# Patient Record
Sex: FEMALE | Race: WHITE | Employment: UNEMPLOYED | URBAN - METROPOLITAN AREA
[De-identification: names, ages, dates, MRNs, and addresses within clinical notes are randomized per-mention and may not be internally consistent; named-entity substitution may affect disease eponyms.]

---

## 2017-09-29 ENCOUNTER — APPOINTMENT (OUTPATIENT)
Dept: RADIOLOGY | Facility: CLINIC | Age: 62
End: 2017-09-29
Payer: COMMERCIAL

## 2017-09-29 ENCOUNTER — ALLSCRIPTS OFFICE VISIT (OUTPATIENT)
Dept: OTHER | Facility: OTHER | Age: 62
End: 2017-09-29

## 2017-09-29 DIAGNOSIS — M79.642 PAIN OF LEFT HAND: ICD-10-CM

## 2017-09-29 PROCEDURE — 73130 X-RAY EXAM OF HAND: CPT

## 2017-10-03 NOTE — PROGRESS NOTES
Assessment  1  Pain of left hand (729 5) (M79 642)   2  Trigger middle finger of left hand (727 03) (M65 332)    Plan  Pain of left hand    · * XR HAND 3+ VIEW LEFT; Status:Resulted - Requires Verification,Retrospective By  Protocol Authorization;   Done: 40SIT1960 08:11AM    Criss Bay is in the early stages of trigger finger of the 3rd digit  She may try over the counter medication such as Aleve to help with the inflammation to reduce triggering symptoms  I advised her to take medications with dinner to allow time for it to take effect  She may follow up with me in 2-3 weeks if the medications do not help with symptoms, at that time we will provide a cortisone injection  Discussion/Summary  The patient was counseled regarding diagnostic results,-instructions for management,-risk factor reductions,-prognosis,-patient and family education,-impressions,-risks and benefits of treatment options,-importance of compliance with treatment  Chief Complaint  1  Hand Problem    History of Present Illness  HPI: Criss Bay is a 65year old female who presents today complaining of mild to moderate non-radiating left middle finger and hand pain with mild clicking when flexing her middle finger  She says that she has been feeling this pain for 3-4 months, and denies any memorable injury  she states that she feel the pain when she rests her hand in one position for a period of time  However, after a few minutes of moving the hand and flexing the fingers the pain and clicking subsides  She denies pain during the day, and does not have issues when typing or doing work with the hands  She denies any numbness or tingling  Review of Systems    Constitutional: No fever, no chills, feels well, no tiredness, no recent weight gain or loss  Eyes: No complaints of eyesight problems, no red eyes  ENT: no loss of hearing, no nosebleeds, no sore throat     Cardiovascular: No complaints of chest pain, no palpitations, no leg claudication or lower extremity edema  Respiratory: no compliants of shortness of breath, no wheezing, no cough  Gastrointestinal: no complaints of abdominal pain, no constipation, no nausea or diarrhea, no vomiting, no bloody stools  Genitourinary: no complaints of dysuria, no incontinence  Musculoskeletal: as noted in HPI  Integumentary: no complaints of skin rash or lesion, no itching or dry skin, no skin wounds  Neurological: no complaints of headache, no confusion, no numbness or tingling, no dizziness  Endocrine: No complaints of muscle weakness, no feelings of weakness, no frequent urination, no excessive thirst    Psychiatric: No suicidal thoughts, no anxiety, no feelings of depression  ROS reviewed  Active Problems  1  Body mass index (BMI) 21 0-21 9, adult (V85 1) (Z68 21)   2  Chest discomfort (786 59) (R07 89)   3  Colon cancer screening (V76 51) (Z12 11)   4  Dyspareunia (625 0)   5  Encounter for routine gynecological examination (V72 31) (Z01 419)   6  Hip tendonitis, left (727 09) (M76 892)   7  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)   8  Left groin pain (789 09) (R10 32)   9  Left hip pain (719 45) (M25 552)   10  Left-sided low back pain with left-sided sciatica (724 3) (M54 42)   11  Menopausal vaginal dryness (627 2) (N95 1)   12  Menopause (627 2) (Z78 0)   13  Neuritis (729 2) (M79 2)   14  Osteoporosis screening (V82 81) (Z13 820)   15  Pain of left hand (729 5) (M79 642)   16  Pes planus, unspecified laterality (734) (M21 40)   17  Screening for osteoporosis (V82 81) (Z13 820)   18   Visit for screening mammogram (V76 12) (Z12 31)    Past Medical History   · History of Denial Of Any Significant Medical History   · History of Diabetes mellitus screening (V77 1) (Z13 1)   · History of Screening for cardiovascular condition (V81 2) (Z13 6)   · History of Screening for thyroid disorder (V77 0) (Z13 29)   · History of Visit for screening mammogram (V76 12) (Z12 31)    The active problems and past medical history were reviewed and updated today  Surgical History   · History of Hemorrhoidectomy   · History of Incisional Breast Biopsy   · History of Shoulder Surgery   · History of Supracervical Hysterectomy    The surgical history was reviewed and updated today  Family History  Mother    · Family history of malignant neoplasm of uterus (V16 49) (Z80 51)  Father    · Family history of cardiac disorder (V17 49) (Z82 49)   · Family history of diabetes mellitus (V18 0) (Z83 3)  Sister    · Family history of Hip arthrosis    The family history was reviewed and updated today  Social History   · Never A Smoker   · No drug use   · Occasional alcohol use  The social history was reviewed and updated today  The social history was reviewed and is unchanged  Current Meds   1  Estring 2 MG Vaginal Ring; INSERT 1 RING INTRAVAGINALLY EVERY 3 MONTHS; Therapy: 43SYV6331 to (Jessica Humphries)  Requested for: 72JFF5926; Last   Rx:37Zyx5093 Ordered   2  Pennsaid 2 % Transdermal Solution; use 1 pump q 12 hours as needed to painful areas; Therapy: 40Kjw4426 to (Last Rx:77Ilh9790)  Requested for: 93Wim1913 Ordered    The medication list was reviewed and updated today  Allergies  1  No Known Drug Allergies    Vitals   Recorded: 19WUH4233 08:05AM   Heart Rate 80   Systolic 222   Diastolic 80   Height 5 ft 7 in   Weight 139 lb    BMI Calculated 21 77   BSA Calculated 1 73     Physical Exam    Left Third Digit: no deformity, nodules, erythema, ecchymosis, edema, or tenderness,-ROM within normal limits in flexion and extension at the MCP, PIP, and DIP joints,-strength within normal limits-and-no evidence of laxity or instability at the MCP, PIP, and DIP joints  Left Hand: Appearance: Normal  Tenderness: MCP joint(s) (3rd and MCP )  Left Third Digit/Hand: Appearance: Normal  Tenderness: 3rd MCP joint     Constitutional - General appearance: Normal    Musculoskeletal - Gait and station: Normal -Muscle strength/tone: Normal -Upper extremity compartments: Normal    Cardiovascular - Pulses: Normal -Examination of extremities for edema and/or varicosities: Normal    Skin - Skin and subcutaneous tissue: Normal    Neurologic - Sensation: Normal -Upper extremity peripheral neuro exam: Normal    Psychiatric - Orientation to person, place, and time: Normal -Mood and affect: Normal    Eyes   Conjunctiva and lids: Normal     Pupils and irises: Normal        Results/Data  * XR HAND 3+ VIEW LEFT [id number] 08:11AM [facility] Order Number: [id number]     Test Name Result Flag Reference   XR HAND 3+ VW LEFT (Report)     LEFT HAND     INDICATION: Left hand pain  COMPARISON: None     VIEWS: 3     IMAGES: 3     For the purposes of institution wide universal language the following terms will apply: (thumb=1st digit/finger, index finger=2nd digit/finger, long finger=3rd digit/finger, ring=4th digit/finger and small finger=5th digit/finger)     FINDINGS:     There is no acute fracture or dislocation  No degenerative changes  No lytic or blastic lesions are seen  Soft tissues are unremarkable  IMPRESSION:     No acute osseous abnormality  Workstation performed: [id number]     Signed by:   Ernesto Mora MD   9/29/17                             I personally reviewed the films/images/results in the office today  My interpretation follows  X-ray Review X-Rays of the left hand and wrist did not show any osseus fracture or signs of osteoarthritis  Also, presented with proper anatomical bony alinement  Attending Note  Scribe Attestation:   Scribe Attestation IJudith ATC am acting as a scribe in the presence of the attending physician while the attending physician examines the patient  Physician Attestation:   Adam Mathew MD personally performed the services described in this documentation as scribed in my presence, and it is both accurate and complete  Future Appointments    Date/Time Provider Specialty Site   10/05/2017 08:00 AM COLIN Espitia   [address] [facility]     Signatures   Electronically signed by : COLIN Field ; Oct  2 2017  7:49AM EST                       (Author)

## 2017-11-02 ENCOUNTER — ALLSCRIPTS OFFICE VISIT (OUTPATIENT)
Dept: OTHER | Facility: OTHER | Age: 62
End: 2017-11-02

## 2017-11-02 DIAGNOSIS — Z78.0 ASYMPTOMATIC MENOPAUSAL STATE: ICD-10-CM

## 2017-11-02 DIAGNOSIS — Z00.00 ENCOUNTER FOR GENERAL ADULT MEDICAL EXAMINATION WITHOUT ABNORMAL FINDINGS: ICD-10-CM

## 2017-11-07 LAB
HPV 18 (HISTORICAL): NOT DETECTED
HPV HIGH RISK 16/18 (HISTORICAL): NOT DETECTED
HPV16 (HISTORICAL): NOT DETECTED
PAP (HISTORICAL): NORMAL

## 2018-01-12 VITALS
SYSTOLIC BLOOD PRESSURE: 125 MMHG | HEIGHT: 67 IN | HEART RATE: 80 BPM | BODY MASS INDEX: 21.82 KG/M2 | WEIGHT: 139 LBS | DIASTOLIC BLOOD PRESSURE: 80 MMHG

## 2018-01-14 VITALS
HEIGHT: 67 IN | DIASTOLIC BLOOD PRESSURE: 62 MMHG | BODY MASS INDEX: 21.82 KG/M2 | SYSTOLIC BLOOD PRESSURE: 110 MMHG | WEIGHT: 139 LBS

## 2018-01-16 NOTE — PROGRESS NOTES
History of Present Illness  Care Coordination Encounter Information:   Type of Encounter: Telephonic   Last Office Visit: 4/19/16   Spoke to Patient  Care Coordination  Nurse [address] [place] [address] [address]:   The reason for call is to discuss outreach for follow up/needed services and coordination of meeting care plan treatment goals  I reached out to Patient as per central scheduling  She is willing to schedule her ortho appointment at this time  Put her in touch with central scheduling to do so  Active Problems    1  Body mass index (BMI) 21 0-21 9, adult (V85 1) (Z68 21)   2  Chest discomfort (786 59) (R07 89)   3  Colon cancer screening (V76 51) (Z12 11)   4  Dyspareunia (625 0)   5  Encounter for routine gynecological examination (V72 31) (Z01 419)   6  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)   7  Left groin pain (789 09) (R10 30)   8  Left hip pain (719 45) (M25 552)   9  Left-sided low back pain with left-sided sciatica (724 3) (M54 42)   10  Menopausal vaginal dryness (627 2) (N95 1)   11  Menopause (627 2) (Z78 0)   12  Neuritis (729 2) (M79 2)   13  Osteoporosis screening (V82 81) (Z13 820)   14  Pes planus, unspecified laterality (734) (M21 40)   15  Screening for osteoporosis (V82 81) (Z13 820)   16  Visit for screening mammogram (V76 12) (Z12 31)    Past Medical History    1  History of Denial Of Any Significant Medical History   2  History of Diabetes mellitus screening (V77 1) (Z13 1)   3  History of Screening for cardiovascular condition (V81 2) (Z13 6)   4  History of Screening for thyroid disorder (V77 0) (Z13 29)   5  History of Visit for screening mammogram (V76 12) (Z12 31)    Surgical History    1  History of Hemorrhoidectomy   2  History of Incisional Breast Biopsy   3  History of Shoulder Surgery   4  History of Supracervical Hysterectomy    Family History  Mother    1  Family history of malignant neoplasm of uterus (V16 49) (Z80 49)  Father    2  Family history of cardiac disorder (V17 49) (Z82 49)   3  Family history of diabetes mellitus (V18 0) (Z83 3)  Sister    4  Family history of Hip arthrosis    Social History    · Never A Smoker   · No drug use   · Occasional alcohol use    Current Meds    1  Pennsaid 2 % Transdermal Solution; use 1 pump q 12 hours as needed to painful   areas; Therapy: 88Kpy2463 to (Last Rx:00Plm9546)  Requested for: 21Stm9776 Ordered    2  Estring 2 MG Vaginal Ring; INSERT 1 RING INTRAVAGINALLY EVERY 3 MONTHS; Therapy: 53LWG5985 to (Raisa Meade)  Requested for: 99UVI1299; Last   Rx:20Rou2655 Ordered    Allergies    1  No Known Drug Allergies    Health Management   COLONOSCOPY; every 10 years; Last 34XKF3477; Next Due: 81IUS6273; Active    End of Encounter Meds    1  Pennsaid 2 % Transdermal Solution; use 1 pump q 12 hours as needed to painful   areas; Therapy: 23Olf3344 to (Last Rx:25Svz7624)  Requested for: 31Crf5390 Ordered    2  Estring 2 MG Vaginal Ring; INSERT 1 RING INTRAVAGINALLY EVERY 3 MONTHS; Therapy: 07TBQ6550 to (Raisa Meade)  Requested for: 21KVB9726; Last   SA:85LPL5561 Ordered    Patient Care Team    Care Team Member Role Specialty Office Number   Oly Sydney LAIRD  Specialist Obstetrics/Gynecology [phone]     Signatures   Electronically signed by :  Monica Orellana RN; May 23 2016  2:04PM EST                       (Author)

## 2018-11-06 ENCOUNTER — TELEPHONE (OUTPATIENT)
Dept: FAMILY MEDICINE CLINIC | Facility: CLINIC | Age: 63
End: 2018-11-06

## 2018-11-06 NOTE — TELEPHONE ENCOUNTER
DR John Smith - SHE WOULD LIKE TO KNOW IF YOU WILL TAKE HER ON AS A PT   YOU ALREADY SEE HER , [place] Zavaletajen

## 2018-12-14 ENCOUNTER — OFFICE VISIT (OUTPATIENT)
Dept: FAMILY MEDICINE CLINIC | Facility: CLINIC | Age: 63
End: 2018-12-14
Payer: COMMERCIAL

## 2018-12-14 VITALS
RESPIRATION RATE: 18 BRPM | OXYGEN SATURATION: 98 % | TEMPERATURE: 98.2 F | SYSTOLIC BLOOD PRESSURE: 126 MMHG | BODY MASS INDEX: 21.5 KG/M2 | HEIGHT: 67 IN | WEIGHT: 137 LBS | DIASTOLIC BLOOD PRESSURE: 84 MMHG | HEART RATE: 78 BPM

## 2018-12-14 DIAGNOSIS — M54.2 ARTHRALGIA OF CERVICAL SPINE: Primary | ICD-10-CM

## 2018-12-14 DIAGNOSIS — Z23 ENCOUNTER FOR IMMUNIZATION: ICD-10-CM

## 2018-12-14 PROBLEM — M79.18 CERVICAL MYOFASCIAL PAIN SYNDROME: Status: ACTIVE | Noted: 2018-12-14

## 2018-12-14 PROBLEM — M65.332 TRIGGER MIDDLE FINGER OF LEFT HAND: Status: ACTIVE | Noted: 2017-09-29

## 2018-12-14 PROCEDURE — 99214 OFFICE O/P EST MOD 30 MIN: CPT | Performed by: FAMILY MEDICINE

## 2018-12-14 PROCEDURE — 90471 IMMUNIZATION ADMIN: CPT

## 2018-12-14 PROCEDURE — 90715 TDAP VACCINE 7 YRS/> IM: CPT

## 2018-12-14 PROCEDURE — 90682 RIV4 VACC RECOMBINANT DNA IM: CPT

## 2018-12-14 PROCEDURE — 90472 IMMUNIZATION ADMIN EACH ADD: CPT

## 2018-12-14 NOTE — PROGRESS NOTES
I have identified this patient to be Chencho Ramos,  -1955  C/O: pain in neck  Had MRI done of the neck in 2012 by a neurologist in [place]  Apparently was benign  Has gotten a lot worse in the past 6 months  Red flag screens: No unexplained fevers/chills/sweats/weight loss/history of IVDA/immunosuppressive disorder/personal history of cancer/unusual night pain/deep bone pain/unexplained neurological symptoms such as numbness, motor weakness, sphincter dysfunction of bladder or bowel, saddle anesthesia, cramping ache legs with activity such as walking (for spinal stenosis)     Walks, treadmill  [place]  Stress : medium      There is no problem list on file for this patient  Imaging done so far: MRI neck  - results not available but apparently benign    Physical therapy:  none visits so far  Red flag screens: No unexplained fevers/chills/sweats/weight loss/history of IVDA/immunosuppressive disorder/personal history of cancer/unusual deep bone pain at night /unexplained neurological symptoms such as numbness, motor weakness, sphincter dysfunction of bladder or bowel NEG    /84 (BP Location: Left arm, Patient Position: Sitting)   Pulse 78   Temp 98 2 °F (36 8 °C) (Tympanic)   Resp 18   Ht 5' 7" (1 702 m)   Wt 62 1 kg (137 lb)   SpO2 98%   BMI 21 46 kg/m²       Alert, NAD  Evaluation of neck  Inspection: Within normal limits  Palpation: + some L sided trigger points  No midline focal tenderness  AROM: Mild limitation on lateral bends and extension/flexion, quasi-capsular pattern  Special tests: Neg Spurling's test  Distal neurovascular intact UE full exam and LE reflexes    Overall impression & Plan:     1  Arthralgia of cervical spine  Somatics/Feldenkrais  Dry needled 3 L sided trapezial ridge and L neck trigger points after verbal permission    F/u 3 - 4 weeks              No future appointments      Please see key below for Sports medicine/Musculoskeletal abbreviations:  AAF/M = Afroamerican female/male  AC(J) = Acromial-Clavicular (joint)  AROM = Active range of motion  ASIC = Anterior superior iliac spine  ASU = Avocado soy unsaponifiables (botanical for joint health)  ATF(L) = Anterior talo-fibular (ligament)  CCP = Anti-cyclic Citrullinated Peptide antibody  CF(L) = Calcaneal-fibular (ligament)  C/O = Complains of  C/W = Consistent with  CRP = C reactive peptide  CRPS = Complex regional pain syndrome  DDX = Differential diagnosis  DX = Diagnosis  DIP = Distal interphalangeal joint  DTR(s) = Deep Tendon Reflex(es)  ESR = Erythrocyte sedimentation rate  CHARLA = Femoral-acetabular impingement  FAIR test = Flexion, Adduction, Internal Rotation (test for Pyriformis syndrome with sciatic symptoms)  WILBERTO = Femoral ABduction, External Rotation test  FADIR = Femoral ADduction, Internal Rotation test  IP = Interphalangeal  ITB = Iliotibial Band  L = Left  LCL = Lateral Collateral Ligament  LE = Lower extremetie(s)  LT = Light Touch (as part of sensory neurological exam)  Mg = Magnesium  MCL = Medial Collateral Ligament  MTC = Metacarpal  MTT = Metatarsal  NSAID = Nonsteroidal anti-inflammatory drug  OOW = Out of Work  OTC = Over the counter  PIP = Proximal interphalangeal joint  PF = Patellafemoral  PROM = Passive range of motion  PT = Physical therapy  R = Right  REHAB = Rehabilitation  SAMe = S-Adenosyl methione  SI = Sacroiliac  SICK (scapula) = Scapular malposition, Inferior medial border prominence, Coracoid pain and malposition, and dysKinesis of scapular movement)  SLAP = Superior labral anterior-posterior (tear)  TFCC (Triangular Fibrocartilage Complex)  TX = Treatment  UE = Upper extremetie(s)  US = Ultrasound   WNL = Within Normal Limits

## 2018-12-14 NOTE — PATIENT INSTRUCTIONS
Advil if have a bad day    Move your body every half hour - practice lengthening neck gently  [place]oll neck pillow        For InnoCyte Mind/body integration exercises, a good resource is www  [employer]  You can buy an MP3 download, CD's or sometimes also DVDs  Prices are subject to change  Available programs include (Navigation: 'Shop online' --> 'Shop specific themes' -->):    1  Relaxercise - a series of gentle exercises that can be very helpful for spine/neck problems  Relaxercise Audio Set $75    2  Moving out of pain: $96    Good Somatics resources:      1  Somatics materials: http://www [employer]/  com/catOrdering html:  AUDIO: Somatic Exercises[TM] narrated by Harinder Orourke:       Somatic Exercises[TM]for the Neck, Jaw and Skull (CDs)      Somatic Exercises[TM]for Rounded Shoulders and Depressed Chests (CDs)        AUDIO: Somatic Exercises[TM] narrated by Keo Hernadez Ed D :    The Five-Minute Somatic Relaxation (CD)    Somatic Exercises[TM]for Stress Management and Increased Flexibility (Audiotapes)        Somatic Exercises[TM]for the Face, Eyes, Neck & Shoulders (CDs)         2  Another useful site  Http://[employer]/products/#cds is the website that sells Somatics CDs by Jackson Cordoba, a reputable Somatics practitioner  Examples of useful CDs:    Swimming in All Directions to Free Your Shoulders - $15 00    Functional Differentiation of the  Lumbar, Thoracic, & Cervical Spine - $15 00    Movements for Sensing & Freeing the Sacrum & Cranium - $15 00    Swaying in the [place] - $15 00          Also carries CDs by Harinder Orourke, the developer of Somatics, as listed in the site above  Somatics Educational Resources  Lory [address], [address]  [place], [place] [address]    3   [address] (has MP3 downloads), not too pricey: such as Eliminate Back Pain'  https://somaticmovementcenter [employer]/learn-somatic-exercises-at-home/        Tips on practicing Somatic education:  1  What counts is the level of careful body awareness one brings to the exercises, not the vigorousness or amplitude of the movement  2  Just like learning anything, Somatic education takes time  3  The more consistent your practice, the better the results  4  These exercises in general are very safe, however, if something hurts, back off or don't do it  Use your common sense       Enjoy your exploration of Somatics! - Dr Laura Murrell

## 2019-01-11 ENCOUNTER — OFFICE VISIT (OUTPATIENT)
Dept: FAMILY MEDICINE CLINIC | Facility: CLINIC | Age: 64
End: 2019-01-11
Payer: COMMERCIAL

## 2019-01-11 VITALS
OXYGEN SATURATION: 98 % | BODY MASS INDEX: 21.5 KG/M2 | WEIGHT: 137 LBS | HEART RATE: 82 BPM | SYSTOLIC BLOOD PRESSURE: 100 MMHG | RESPIRATION RATE: 18 BRPM | DIASTOLIC BLOOD PRESSURE: 68 MMHG | HEIGHT: 67 IN | TEMPERATURE: 97.6 F

## 2019-01-11 DIAGNOSIS — M54.2 DISCOGENIC CERVICAL PAIN: Primary | ICD-10-CM

## 2019-01-11 PROBLEM — M50.30 DISCOGENIC CERVICAL PAIN: Status: ACTIVE | Noted: 2019-01-11

## 2019-01-11 PROCEDURE — 99214 OFFICE O/P EST MOD 30 MIN: CPT | Performed by: FAMILY MEDICINE

## 2019-01-11 PROCEDURE — 1036F TOBACCO NON-USER: CPT | Performed by: FAMILY MEDICINE

## 2019-01-11 PROCEDURE — 3008F BODY MASS INDEX DOCD: CPT | Performed by: FAMILY MEDICINE

## 2019-01-11 RX ORDER — NAPROXEN 500 MG/1
500 TABLET ORAL 2 TIMES DAILY WITH MEALS
Qty: 60 TABLET | Refills: 1 | Status: SHIPPED | OUTPATIENT
Start: 2019-01-11 | End: 2019-01-15 | Stop reason: ALTCHOICE

## 2019-01-11 NOTE — PATIENT INSTRUCTIONS
Curcumins are potent anti-inflammatory natural medicines derived from turmeric, the common [place] ([facility]) spice  In order to reduce inflammation in the body, it is important to use brands that are specifically designed to be absorbed from the gut and are reputed to be of good quality, Common uses include for arthritis, autoimmune disorders, chronic pain or the inflammatory component of chronic diseases such as diabetes, heart disease and others  These can be purchased at local healthy food stores such as [employer] or reputable online sites such as www  [employer]  com    Pick one of the following brands:  1  Doctor's Best - 'Best Curcumin C3 Complex with BioPerine' 1 gram twice a day with or without food  2  Nutrigold 'Turmeric Curcumin Gold' 500 mg cap once to three times a day with food  3  Life Extension 'Super Bio-Curcumin' 400 mg 1 - 2 caps daily  4  NutriPhysical Curcumin Extreme 400 mg 1 cap daily  5  Indena S p  A  Meriva 500 mg 1 - 2 caps daily (available at [employer]  com)    Generally it is best to take Curcumin with a meal that has some fat (to help absorption), though the above formulations do have improved absorption even if not taken with a meal        Generally these supplements are very safe and well tolerated  Occasionally any supplement can cause stomach upset but this is unusual  Other side effects such as liver irritation are uncommon  Very high doses could thin the blood, so let your doctor know if you are on strong blood thinners such as Warfarin (Coumadin)  Patients with active gallbladder disease (biliary colic) might want to avoid high doses of curcumin  High dose Turmeric may increase urinary oxalate levels, theoretically increasing kidney stone formation is susceptible patients  Curcumin may bind iron, so if iron deficient, take iron and turmeric/curcumin at different times     Curcumin with the bioavailability (absorption) enhancer piperine from black pepper could increase the absorption of certain medications such as phenytoin, rifampin, propranolol, amlodipine, felodipine, nevirapine, so it might be best to take any of these drugs at a different time than Curcumin with Biopayse  All women who are pregnant or breast-feeding should discuss with their doctor before using any supplement or medicine

## 2019-01-11 NOTE — PROGRESS NOTES
Here for follow up of neck issues  Neck remains sore  Worse with sitting  Down left side of neck and to upper back, but not down arm  No numbness or weakness  Otherwise feels well  NO fever  Overall 0 % better  Imaging: see MRI report    Physical therapy No    Alert, NAD  /68 (BP Location: Left arm, Patient Position: Sitting, Cuff Size: Adult)   Pulse 82   Temp 97 6 °F (36 4 °C) (Tympanic)   Resp 18   Ht 5' 7" (1 702 m)   Wt 62 1 kg (137 lb)   SpO2 98%   BMI 21 46 kg/m²     Evaluation of neck  Inspection: within normal limits  Palpation: abnormal - some trigger points trapezial ridges  AROM: abnormal - mild ache and limitation and R side bend  Muscle testing: within normal limits  Distal neurovascular intact    Overall impression:   1  Discogenic cervical pain  Somatics/Feldenkrais  - naproxen (EC NAPROSYN) 500 MG EC tablet; Take 1 tablet (500 mg total) by mouth 2 (two) times a day with meals  Dispense: 60 tablet; Refill: 1  - Ambulatory referral to Physical Therapy; Future  After explaining procedure and risks/benefits and answering any questions, patient gave verbal consent for dry needling of noted trigger points  Patient tolerated procedure well  Sterile single use disposable acupuncture needles used and all needles were accounted for and disposed of in the sharps container after the procedure  3 trigger points L trapezial ridge and L upper nuchal area  Tolerated well       F/u 1 - 2 months

## 2019-01-15 DIAGNOSIS — M54.2 DISCOGENIC CERVICAL PAIN: Primary | ICD-10-CM

## 2019-01-15 RX ORDER — IBUPROFEN 600 MG/1
600 TABLET ORAL EVERY 6 HOURS PRN
Qty: 30 TABLET | Refills: 1 | Status: SHIPPED | OUTPATIENT
Start: 2019-01-15 | End: 2020-08-03

## 2019-03-08 ENCOUNTER — TELEPHONE (OUTPATIENT)
Dept: FAMILY MEDICINE CLINIC | Facility: CLINIC | Age: 64
End: 2019-03-08

## 2019-03-08 DIAGNOSIS — Z12.31 BREAST CANCER SCREENING BY MAMMOGRAM: ICD-10-CM

## 2019-03-08 DIAGNOSIS — Z51.81 THERAPEUTIC DRUG MONITORING: ICD-10-CM

## 2019-03-08 DIAGNOSIS — Z12.39 SCREENING BREAST EXAMINATION: Primary | ICD-10-CM

## 2019-03-08 DIAGNOSIS — Z11.59 NEED FOR HEPATITIS C SCREENING TEST: ICD-10-CM

## 2019-03-08 DIAGNOSIS — Z13.220 LIPID SCREENING: ICD-10-CM

## 2019-03-08 NOTE — TELEPHONE ENCOUNTER
Called patient, made her aware that blood work and mammo are ordered  Mailing orders to patient's home

## 2019-03-20 LAB
BUN SERPL-MCNC: 14 MG/DL (ref 8–27)
BUN/CREAT SERPL: 17 (ref 12–28)
CALCIUM SERPL-MCNC: 10.1 MG/DL (ref 8.7–10.3)
CHLORIDE SERPL-SCNC: 102 MMOL/L (ref 96–106)
CHOLEST SERPL-MCNC: 198 MG/DL (ref 100–199)
CHOLEST/HDLC SERPL: 2.7 RATIO (ref 0–4.4)
CO2 SERPL-SCNC: 26 MMOL/L (ref 20–29)
CREAT SERPL-MCNC: 0.82 MG/DL (ref 0.57–1)
GLUCOSE SERPL-MCNC: 109 MG/DL (ref 65–99)
HCV AB S/CO SERPL IA: <0.1 S/CO RATIO (ref 0–0.9)
HDLC SERPL-MCNC: 74 MG/DL
LDLC SERPL CALC-MCNC: 105 MG/DL (ref 0–99)
POTASSIUM SERPL-SCNC: 5.4 MMOL/L (ref 3.5–5.2)
SL AMB EGFR AFRICAN AMERICAN: 89 ML/MIN/1.73
SL AMB EGFR NON AFRICAN AMERICAN: 77 ML/MIN/1.73
SL AMB VLDL CHOLESTEROL CALC: 19 MG/DL (ref 5–40)
SODIUM SERPL-SCNC: 145 MMOL/L (ref 134–144)
TRIGL SERPL-MCNC: 97 MG/DL (ref 0–149)

## 2019-05-09 ENCOUNTER — ANNUAL EXAM (OUTPATIENT)
Dept: OBGYN CLINIC | Facility: CLINIC | Age: 64
End: 2019-05-09
Payer: COMMERCIAL

## 2019-05-09 VITALS
HEIGHT: 67 IN | SYSTOLIC BLOOD PRESSURE: 100 MMHG | DIASTOLIC BLOOD PRESSURE: 70 MMHG | WEIGHT: 138 LBS | BODY MASS INDEX: 21.66 KG/M2

## 2019-05-09 DIAGNOSIS — Z01.419 WOMEN'S ANNUAL ROUTINE GYNECOLOGICAL EXAMINATION: Primary | ICD-10-CM

## 2019-05-09 DIAGNOSIS — Z12.39 SCREENING FOR MALIGNANT NEOPLASM OF BREAST: ICD-10-CM

## 2019-05-09 DIAGNOSIS — Z12.4 PAP SMEAR FOR CERVICAL CANCER SCREENING: ICD-10-CM

## 2019-05-09 DIAGNOSIS — N95.1 MENOPAUSAL VAGINAL DRYNESS: ICD-10-CM

## 2019-05-09 PROCEDURE — 99396 PREV VISIT EST AGE 40-64: CPT | Performed by: OBSTETRICS & GYNECOLOGY

## 2019-05-09 RX ORDER — ESTRADIOL 0.1 MG/G
1 CREAM VAGINAL 3 TIMES WEEKLY
Qty: 42.5 G | Refills: 3 | Status: SHIPPED | OUTPATIENT
Start: 2019-05-10 | End: 2020-08-03

## 2019-05-14 LAB
HPV HR 12 DNA CVX QL NAA+PROBE: NOT DETECTED
HPV16 DNA SPEC QL NAA+PROBE: NOT DETECTED
HPV18 DNA SPEC QL NAA+PROBE: NOT DETECTED
THIN PREP CVX: NORMAL

## 2020-08-03 ENCOUNTER — ANNUAL EXAM (OUTPATIENT)
Dept: OBGYN CLINIC | Facility: CLINIC | Age: 65
End: 2020-08-03
Payer: COMMERCIAL

## 2020-08-03 VITALS
WEIGHT: 138 LBS | TEMPERATURE: 97.2 F | HEIGHT: 67 IN | DIASTOLIC BLOOD PRESSURE: 70 MMHG | SYSTOLIC BLOOD PRESSURE: 128 MMHG | BODY MASS INDEX: 21.66 KG/M2

## 2020-08-03 DIAGNOSIS — Z13.9 ENCOUNTER FOR HEALTH-RELATED SCREENING: ICD-10-CM

## 2020-08-03 DIAGNOSIS — Z01.419 ENCOUNTER FOR GYNECOLOGICAL EXAMINATION WITHOUT ABNORMAL FINDING: Primary | ICD-10-CM

## 2020-08-03 DIAGNOSIS — Z12.39 SCREENING FOR MALIGNANT NEOPLASM OF BREAST: ICD-10-CM

## 2020-08-03 PROCEDURE — 99396 PREV VISIT EST AGE 40-64: CPT | Performed by: OBSTETRICS & GYNECOLOGY

## 2020-08-03 PROCEDURE — 3008F BODY MASS INDEX DOCD: CPT | Performed by: OBSTETRICS & GYNECOLOGY

## 2020-08-03 PROCEDURE — 1036F TOBACCO NON-USER: CPT | Performed by: OBSTETRICS & GYNECOLOGY

## 2020-08-03 NOTE — PROGRESS NOTES
Subjective      Rosa Bustamante is a 62 y o   female who presents for annual well woman exam  Patient reports no bleeding, spotting, or discharge  Patient reports No hot flashes/night sweats, not significantly sexually active did not fill estrogen cream due to expense  We reviewed different options to address concerns patient is satisfied with current status and does not desire to investigate further options at this time, Yes  vaginal dryness, sleeping poorly patient reports energy is good and interrupted sleep is same as for some time  Patient has history of supracervical hysterectomy  Current contraception: status post hysterectomy  History of abnormal Pap smear: no  Family history of uterine or ovarian cancer: yes - mother with uterine cancer  Regular self breast exam: yes  History of abnormal mammogram: no  Family history of breast cancer: no    Menstrual History:  OB History        3    Para   2    Term   2            AB   1    Living   2       SAB   1    TAB        Ectopic        Multiple        Live Births   2                 No LMP recorded  Patient has had a hysterectomy  The following portions of the patient's history were reviewed and updated as appropriate: allergies, current medications, past family history, past medical history, past social history, past surgical history and problem list   No past medical history on file  Past Surgical History:   Procedure Laterality Date    HEMORRHOID SURGERY          HYSTERECTOMY      supracervical / [address]      x2     SHOULDER SURGERY      bone spur      OB History        3    Para   2    Term   2            AB   1    Living   2       SAB   1    TAB        Ectopic        Multiple        Live Births   2                 Review of Systems  Review of Systems   Constitutional: Negative for chills, fatigue, fever and unexpected weight change     HENT: Negative for dental problem, sinus pressure and sinus pain  Eyes: Negative for visual disturbance  Respiratory: Negative for cough, shortness of breath and wheezing  Cardiovascular: Negative for chest pain and leg swelling  Gastrointestinal: Negative for constipation, diarrhea, nausea and vomiting  Genitourinary: Negative for menstrual problem, pelvic pain and urgency  Musculoskeletal: Negative for back pain  Allergic/Immunologic: Negative for environmental allergies  Neurological: Negative for dizziness and headaches  Objective      /70 (BP Location: Right arm, Patient Position: Sitting, Cuff Size: Standard)   Temp (!) 97 2 °F (36 2 °C)   Ht 5' 7"   Wt 62 6 kg (138 lb)   BMI 21 61 kg/m²   Vitals:    20 0956   BP: 128/70   BP Location: Right arm   Patient Position: Sitting   Cuff Size: Standard   Temp: (!) 97 2 °F (36 2 °C)   Weight: 62 6 kg (138 lb)   Height: 5' 7"       General:   alert and oriented, in no acute distress   Heart: regular rate and rhythm, S1, S2 normal, no murmur, click, rub or gallop   Lungs: clear to auscultation bilaterally   Abdomen: soft, non-tender, without masses or organomegaly   Vulva: normal   Vagina: normal mucosa   Cervix: no cervical motion tenderness and no lesions   Uterus: surgically absent   Adnexa: no mass, fullness, tenderness   Breast inspection negative, no nipple discharge or bleeding, no masses or nodularity palpable somewhat dense tissue palpable  Rectal negative, stool guaiac negative  Thyroid normal no masses or nodules     Assessment   62year-old  here for annual exam   Normal Pap May of 2019 with negative high-risk HPV, normal  mammogram      Plan   Given slip for mammogram with 3 D and CAD, return in 1 year or sooner as needed, Pap not indicated, patient to call if desires us to address vaginal dryness further cream was too expensive, declined additional intervention at this time

## 2020-08-26 LAB
25(OH)D3+25(OH)D2 SERPL-MCNC: 25.5 NG/ML (ref 30–100)
BASOPHILS # BLD AUTO: 0.1 X10E3/UL (ref 0–0.2)
BASOPHILS NFR BLD AUTO: 1 %
CHOLEST SERPL-MCNC: 190 MG/DL (ref 100–199)
EOSINOPHIL # BLD AUTO: 0.2 X10E3/UL (ref 0–0.4)
EOSINOPHIL NFR BLD AUTO: 3 %
ERYTHROCYTE [DISTWIDTH] IN BLOOD BY AUTOMATED COUNT: 12.2 % (ref 11.7–15.4)
EST. AVERAGE GLUCOSE BLD GHB EST-MCNC: 123 MG/DL
HBA1C MFR BLD: 5.9 % (ref 4.8–5.6)
HCT VFR BLD AUTO: 38.6 % (ref 34–46.6)
HDLC SERPL-MCNC: 68 MG/DL
HGB BLD-MCNC: 13 G/DL (ref 11.1–15.9)
IMM GRANULOCYTES # BLD: 0 X10E3/UL (ref 0–0.1)
IMM GRANULOCYTES NFR BLD: 0 %
LDLC SERPL CALC-MCNC: 104 MG/DL (ref 0–99)
LYMPHOCYTES # BLD AUTO: 3.2 X10E3/UL (ref 0.7–3.1)
LYMPHOCYTES NFR BLD AUTO: 38 %
MCH RBC QN AUTO: 29.3 PG (ref 26.6–33)
MCHC RBC AUTO-ENTMCNC: 33.7 G/DL (ref 31.5–35.7)
MCV RBC AUTO: 87 FL (ref 79–97)
MONOCYTES # BLD AUTO: 0.9 X10E3/UL (ref 0.1–0.9)
MONOCYTES NFR BLD AUTO: 11 %
NEUTROPHILS # BLD AUTO: 4.1 X10E3/UL (ref 1.4–7)
NEUTROPHILS NFR BLD AUTO: 47 %
PLATELET # BLD AUTO: 314 X10E3/UL (ref 150–450)
RBC # BLD AUTO: 4.43 X10E6/UL (ref 3.77–5.28)
SL AMB T4, FREE (DIRECT): 1.22 NG/DL (ref 0.82–1.77)
SL AMB VLDL CHOLESTEROL CALC: 18 MG/DL (ref 5–40)
TRIGL SERPL-MCNC: 92 MG/DL (ref 0–149)
TSH SERPL DL<=0.005 MIU/L-ACNC: 5.75 UIU/ML (ref 0.45–4.5)
WBC # BLD AUTO: 8.5 X10E3/UL (ref 3.4–10.8)

## 2021-04-13 DIAGNOSIS — Z23 ENCOUNTER FOR IMMUNIZATION: ICD-10-CM

## 2021-05-14 ENCOUNTER — OFFICE VISIT (OUTPATIENT)
Dept: FAMILY MEDICINE CLINIC | Facility: CLINIC | Age: 66
End: 2021-05-14
Payer: COMMERCIAL

## 2021-05-14 VITALS
BODY MASS INDEX: 21 KG/M2 | OXYGEN SATURATION: 98 % | DIASTOLIC BLOOD PRESSURE: 64 MMHG | HEIGHT: 67 IN | TEMPERATURE: 98 F | WEIGHT: 133.8 LBS | SYSTOLIC BLOOD PRESSURE: 110 MMHG | HEART RATE: 79 BPM | RESPIRATION RATE: 18 BRPM

## 2021-05-14 DIAGNOSIS — M25.649 STIFFNESS OF HAND JOINT, UNSPECIFIED LATERALITY: Primary | ICD-10-CM

## 2021-05-14 PROCEDURE — 1036F TOBACCO NON-USER: CPT | Performed by: FAMILY MEDICINE

## 2021-05-14 PROCEDURE — 3008F BODY MASS INDEX DOCD: CPT | Performed by: FAMILY MEDICINE

## 2021-05-14 PROCEDURE — 3725F SCREEN DEPRESSION PERFORMED: CPT | Performed by: FAMILY MEDICINE

## 2021-05-14 PROCEDURE — 99213 OFFICE O/P EST LOW 20 MIN: CPT | Performed by: FAMILY MEDICINE

## 2021-05-14 NOTE — PATIENT INSTRUCTIONS
Patient has diagnosis of stiff hands  The patient may have beginning osteo or rheumatoid arthritis  No workup is needed at this point  I did discuss with the patient the potential of getting a rheumatoid factor or DAVID panel but since she is relatively asymptomatic at this point I did not believe it was necessary  The patient could call me and we can order the panel in the future if her symptoms were to progress  The patient was advised that she could take Tylenol or Motrin periodically for the stiffness    The patient has completed a COVID vaccine series

## 2021-11-19 ENCOUNTER — IMMUNIZATIONS (OUTPATIENT)
Dept: FAMILY MEDICINE CLINIC | Facility: HOSPITAL | Age: 66
End: 2021-11-19

## 2021-11-19 DIAGNOSIS — Z23 ENCOUNTER FOR IMMUNIZATION: Primary | ICD-10-CM

## 2021-11-19 PROCEDURE — 0064A COVID-19 MODERNA VACC 0.25 ML BOOSTER: CPT

## 2021-11-19 PROCEDURE — 91306 COVID-19 MODERNA VACC 0.25 ML BOOSTER: CPT

## 2021-12-15 ENCOUNTER — ANNUAL EXAM (OUTPATIENT)
Dept: OBGYN CLINIC | Facility: CLINIC | Age: 66
End: 2021-12-15
Payer: MEDICARE

## 2021-12-15 VITALS
HEIGHT: 67 IN | DIASTOLIC BLOOD PRESSURE: 78 MMHG | WEIGHT: 134 LBS | TEMPERATURE: 97.6 F | BODY MASS INDEX: 21.03 KG/M2 | SYSTOLIC BLOOD PRESSURE: 114 MMHG

## 2021-12-15 DIAGNOSIS — Z01.419 ENCOUNTER FOR GYNECOLOGICAL EXAMINATION WITHOUT ABNORMAL FINDING: Primary | ICD-10-CM

## 2021-12-15 PROCEDURE — G0101 CA SCREEN;PELVIC/BREAST EXAM: HCPCS | Performed by: OBSTETRICS & GYNECOLOGY

## 2022-03-31 ENCOUNTER — HOSPITAL ENCOUNTER (OUTPATIENT)
Dept: RADIOLOGY | Facility: HOSPITAL | Age: 67
Discharge: HOME | End: 2022-03-31
Attending: OBSTETRICS & GYNECOLOGY
Payer: MEDICARE

## 2022-03-31 VITALS — WEIGHT: 138 LBS | BODY MASS INDEX: 21.66 KG/M2 | HEIGHT: 67 IN

## 2022-03-31 DIAGNOSIS — Z12.39 SCREENING FOR MALIGNANT NEOPLASM OF BREAST: ICD-10-CM

## 2022-03-31 PROCEDURE — 77067 SCR MAMMO BI INCL CAD: CPT

## 2022-03-31 PROCEDURE — 77063 BREAST TOMOSYNTHESIS BI: CPT

## 2022-05-20 ENCOUNTER — TELEPHONE (OUTPATIENT)
Dept: OBGYN CLINIC | Facility: CLINIC | Age: 67
End: 2022-05-20

## 2022-05-20 NOTE — TELEPHONE ENCOUNTER
LMOM needs to see Dr Aleksey Colin advised cancelling appt may have appts same week  best to see Aleksey Colin for this issue

## 2022-06-01 ENCOUNTER — APPOINTMENT (OUTPATIENT)
Dept: RADIOLOGY | Facility: CLINIC | Age: 67
End: 2022-06-01
Payer: MEDICARE

## 2022-06-01 ENCOUNTER — OFFICE VISIT (OUTPATIENT)
Dept: OBGYN CLINIC | Facility: CLINIC | Age: 67
End: 2022-06-01
Payer: MEDICARE

## 2022-06-01 VITALS
HEART RATE: 79 BPM | SYSTOLIC BLOOD PRESSURE: 120 MMHG | BODY MASS INDEX: 21.41 KG/M2 | DIASTOLIC BLOOD PRESSURE: 78 MMHG | HEIGHT: 67 IN | WEIGHT: 136.4 LBS

## 2022-06-01 DIAGNOSIS — M25.551 PAIN IN RIGHT HIP: ICD-10-CM

## 2022-06-01 DIAGNOSIS — M16.11 PRIMARY OSTEOARTHRITIS OF RIGHT HIP: Primary | ICD-10-CM

## 2022-06-01 DIAGNOSIS — M70.61 TROCHANTERIC BURSITIS OF RIGHT HIP: ICD-10-CM

## 2022-06-01 DIAGNOSIS — G89.29 CHRONIC RIGHT HIP PAIN: ICD-10-CM

## 2022-06-01 DIAGNOSIS — M25.551 CHRONIC RIGHT HIP PAIN: ICD-10-CM

## 2022-06-01 PROCEDURE — 99204 OFFICE O/P NEW MOD 45 MIN: CPT | Performed by: ORTHOPAEDIC SURGERY

## 2022-06-01 PROCEDURE — 73502 X-RAY EXAM HIP UNI 2-3 VIEWS: CPT

## 2022-06-01 RX ORDER — MELOXICAM 7.5 MG/1
7.5 TABLET ORAL DAILY
Qty: 30 TABLET | Refills: 1 | Status: SHIPPED | OUTPATIENT
Start: 2022-06-01 | End: 2022-07-21

## 2022-06-01 NOTE — PROGRESS NOTES
Assessment/Plan:  1  Primary osteoarthritis of right hip  XR hip/pelv 2-3 vws right if performed    Ambulatory Referral to Physical Therapy    meloxicam (Mobic) 7 5 mg tablet   2  Trochanteric bursitis of right hip  Ambulatory Referral to Physical Therapy    meloxicam (Mobic) 7 5 mg tablet   3  Chronic right hip pain  Ambulatory Referral to Physical Therapy    meloxicam (Mobic) 7 5 mg tablet       71-year-old female with mild right hip osteoarthritis and trochanteric bursitis  Since she has not had any treatments thus far would like to start with a prescription strength anti-inflammatory as well as physical therapy, patient is agreeable to this plan  I have sent meloxicam 7 5 mg once daily to her pharmacy and have ordered physical therapy for her  She should not take meloxicam with any other NSAIDs  Activity modifications to avoid painful maneuvers  I will plan to see her back in 2 months for repeat clinical evaluation  Subjective: Initial evaluation of right hip pain    Patient ID: Adi Johnston is a 73 y o  female with right hip pain  Patient states that she has had pain in her right hip for several months with no associated injury or trauma  She states initially the pain was in the groin but does radiate to the lateral and posterior aspects of the hip as well as into the thigh  She reports that the pan will occasionally make her leg feel as if it is giving out on her  Denies numbness and tingling  The pain is worsened with any sitting to standing movements such as getting out of the car, crossing her legs, and general activities  She states the pain seems to be getting worse with time  She does take Advil on occasion which does help with her pain  Denies any history of surgeries on the hip  She has not yet been evaluated for this injury  Review of Systems   Constitutional: Negative for appetite change and unexpected weight change  HENT: Negative for congestion and trouble swallowing  Eyes: Negative for visual disturbance  Respiratory: Negative for cough and shortness of breath  Cardiovascular: Negative for chest pain and palpitations  Gastrointestinal: Negative for nausea and vomiting  Endocrine: Negative for cold intolerance and heat intolerance  Musculoskeletal: Positive for arthralgias  Negative for myalgias  Skin: Negative for rash  Neurological: Negative for numbness  History reviewed  No pertinent past medical history      Past Surgical History:   Procedure Laterality Date    HEMORRHOID SURGERY      1995    HYSTERECTOMY      supracervical / [address]      x2 1996    SHOULDER SURGERY      bone spur 1999       Family History   Problem Relation Age of Onset    Uterine cancer Mother     Cancer Mother     Heart disease Father         cardiac disorder     Diabetes Father     Other Sister         hip arthrosis     Uterine cancer Maternal Aunt     Uterine cancer Maternal Aunt     No Known Problems Paternal Aunt     No Known Problems Paternal Aunt     No Known Problems Paternal Aunt     No Known Problems Paternal Aunt     Cancer Paternal Aunt        Social History     Occupational History    Not on file   Tobacco Use    Smoking status: Never Smoker    Smokeless tobacco: Never Used   Vaping Use    Vaping Use: Never used   Substance and Sexual Activity    Alcohol use: Yes     Comment: occasional alcohol use     Drug use: No    Sexual activity: Not Currently     Partners: Male     Birth control/protection: Post-menopausal     Comment: declines STD / HIV testing          Current Outpatient Medications:     meloxicam (Mobic) 7 5 mg tablet, Take 1 tablet (7 5 mg total) by mouth daily, Disp: 30 tablet, Rfl: 1    Multiple Vitamins-Minerals (CENTRUM SILVER 50+WOMEN PO), Take by mouth, Disp: , Rfl:     No Known Allergies    Objective:  Vitals:    06/01/22 1356   BP: 120/78   Pulse: 79       Right Hip Exam     Tenderness   The patient is experiencing tenderness in the greater trochanter  Range of Motion   Abduction: 40   Adduction: 30   Extension: 0   Flexion: 120   External rotation: 50   Internal rotation: 30     Other   Erythema: absent  Sensation: normal  Pulse: present    Comments:  Skin intact, no overhanging pannus  No tenderness over SI joint  No internal or external snapping hip  Positive FADIR  Positive WILBERTO  Positive log roll  Mobilizes to exam table well          Physical Exam  Constitutional:       Appearance: Normal appearance  HENT:      Head: Normocephalic  Eyes:      Extraocular Movements: Extraocular movements intact  Pupils: Pupils are equal, round, and reactive to light  Pulmonary:      Effort: Pulmonary effort is normal       Breath sounds: Normal breath sounds  Musculoskeletal:      Cervical back: Normal range of motion  Comments: See HPI   Skin:     General: Skin is warm and dry  Neurological:      General: No focal deficit present  Mental Status: She is alert and oriented to person, place, and time  Psychiatric:         Mood and Affect: Mood normal          Behavior: Behavior normal        I have personally reviewed pertinent films in PACS and my interpretation is x-rays of the right hip performed today demonstrate mild right hips osteoarthritis  No acute osseous injuries  No lytic or blastic lesions

## 2022-07-20 DIAGNOSIS — M25.551 CHRONIC RIGHT HIP PAIN: ICD-10-CM

## 2022-07-20 DIAGNOSIS — M70.61 TROCHANTERIC BURSITIS OF RIGHT HIP: ICD-10-CM

## 2022-07-20 DIAGNOSIS — M16.11 PRIMARY OSTEOARTHRITIS OF RIGHT HIP: ICD-10-CM

## 2022-07-20 DIAGNOSIS — G89.29 CHRONIC RIGHT HIP PAIN: ICD-10-CM

## 2022-07-21 RX ORDER — MELOXICAM 7.5 MG/1
TABLET ORAL
Qty: 30 TABLET | Refills: 1 | Status: SHIPPED | OUTPATIENT
Start: 2022-07-21

## 2022-08-18 ENCOUNTER — TELEPHONE (OUTPATIENT)
Dept: FAMILY MEDICINE CLINIC | Facility: CLINIC | Age: 67
End: 2022-08-18

## 2023-02-20 DIAGNOSIS — M25.551 CHRONIC RIGHT HIP PAIN: ICD-10-CM

## 2023-02-20 DIAGNOSIS — M16.11 PRIMARY OSTEOARTHRITIS OF RIGHT HIP: ICD-10-CM

## 2023-02-20 DIAGNOSIS — M70.61 TROCHANTERIC BURSITIS OF RIGHT HIP: ICD-10-CM

## 2023-02-20 DIAGNOSIS — G89.29 CHRONIC RIGHT HIP PAIN: ICD-10-CM

## 2023-02-20 RX ORDER — MELOXICAM 7.5 MG/1
TABLET ORAL
Qty: 30 TABLET | Refills: 1 | Status: SHIPPED | OUTPATIENT
Start: 2023-02-20

## 2023-03-01 DIAGNOSIS — Z00.00 WELL ADULT EXAM: Primary | ICD-10-CM

## 2023-03-07 ENCOUNTER — OFFICE VISIT (OUTPATIENT)
Dept: DERMATOLOGY | Age: 68
End: 2023-03-07

## 2023-03-07 VITALS — TEMPERATURE: 97.2 F | HEIGHT: 67 IN | WEIGHT: 138 LBS | BODY MASS INDEX: 21.66 KG/M2

## 2023-03-07 DIAGNOSIS — L82.1 SEBORRHEIC KERATOSIS: ICD-10-CM

## 2023-03-07 DIAGNOSIS — D22.60 MULTIPLE BENIGN MELANOCYTIC NEVI OF UPPER AND LOWER EXTREMITIES AND TRUNK: ICD-10-CM

## 2023-03-07 DIAGNOSIS — D22.70 MULTIPLE BENIGN MELANOCYTIC NEVI OF UPPER AND LOWER EXTREMITIES AND TRUNK: ICD-10-CM

## 2023-03-07 DIAGNOSIS — D18.01 CHERRY ANGIOMA: ICD-10-CM

## 2023-03-07 DIAGNOSIS — L81.4 SOLAR LENTIGO: Primary | ICD-10-CM

## 2023-03-07 DIAGNOSIS — D22.5 MULTIPLE BENIGN MELANOCYTIC NEVI OF UPPER AND LOWER EXTREMITIES AND TRUNK: ICD-10-CM

## 2023-03-07 NOTE — PROGRESS NOTES
[facility] Note     Patient Name: Abdelrahman Tellez  Encounter Date: 03/7/2023     Have you been cared for by a [facility] [address] [facility]ologist in the last 3 years and, if so, which description applies to you? NO  I am considered a "new" patient and must complete all patient intake questions  I am FEMALE/of child-bearing potential     REVIEW OF SYSTEMS:  Have you recently had or currently have any of the following? · Recent fever or chills? No  · Any non-healing wound? No  · Are you pregnant or planning to become pregnant? No  · Are you currently or planning to be nursing or breast feeding? No   PAST MEDICAL HISTORY:  Have you personally ever had or currently have any of the following? If "YES," then please provide more detail  · Skin cancer (such as Melanoma, Basal Cell Carcinoma, Squamous Cell Carcinoma? No  · Tuberculosis, HIV/AIDS, Hepatitis B or C: No  · Systemic Immunosuppression such as Diabetes, Biologic or Immunotherapy, Chemotherapy, Organ Transplantation, Bone Marrow Transplantation No  · Radiation Treatment No   FAMILY HISTORY:  Any "first degree relatives" (parent, brother, sister, or child) with the following? • Skin Cancer, Pancreatic or Other Cancer? No   PATIENT EXPERIENCE:    • Do you want the Dermatologist to perform a COMPLETE skin exam today including a clinical examination under the "bra and underwear" areas? Yes  • If necessary, do we have your permission to call and leave a detailed message on your Preferred Phone number that includes your specific medical information?   Yes      No Known Allergies   Current Outpatient Medications:   •  Multiple Vitamins-Minerals (CENTRUM SILVER 50+WOMEN PO), Take by mouth, Disp: , Rfl:   •  meloxicam (MOBIC) 7 5 mg tablet, TAKE 1 TABLET DAILY (Patient not taking: Reported on 3/7/2023), Disp: 30 tablet, Rfl: 1          • Whom besides the patient is providing clinical information about today's encounter?   o NO ADDITIONAL HISTORIAN (patient alone provided history)    Physical Exam and Assessment/Plan by Diagnosis:    MELANOCYTIC NEVI ("Moles")    Physical Exam:  • Anatomic Location Affected:   Mostly on sun-exposed areas of the trunk, upper and lower extremities   • Morphological Description:  Scattered, 1-4mm round to ovoid, symmetrical-appearing, even bordered, skin colored to dark brown macules/papules, mostly in sun-exposed areas  • Pertinent Positives:  • Pertinent Negatives: Additional History of Present Condition:  Here for skin exam     Assessment and Plan:  Based on a thorough discussion of this condition and the management approach to it (including a comprehensive discussion of the known risks, side effects and potential benefits of treatment), the patient (family) agrees to implement the following specific plan:  • Monitor for changes   • When outside we recommend using a wide brim hat, sunglasses, long sleeve and pants, sunscreen with SPF 52+ with reapplication every 2 hours, or SPF specific clothing   • Routine skin exam recommended      Melanocytic Nevi  Melanocytic nevi ("moles") are caused by collections of the color producing skin cells, or melanocytes, in 1 area in the skin  They can range in color from pink to dark brown and be either raised or flat  Some moles are present at birth (I e , "congenital nevi"), while others come up later in life (i e , "acquired nevi")  Bettie Sicks exposure also stimulates the body to make more moles, ie the more sun you get the more moles you'll grow  Clinically distinguishing a healthy mole from melanoma may be difficult  The "ABCDE's" of moles have been suggested as a means of helping to alert a person to a suspicious mole and the possible increased risk of melanoma  Asymmetry: Healthy moles tend to be symmetric, while melanomas are often asymmetric    Asymmetry means if you draw a line through the mole, the two halves do not match in color, size, shape, or surface texture Any mole that starts to demonstrate "asymmetry" should be examined promptly by a board certified dermatologist      Border: Healthy moles tend to have discrete, even borders  The border of a melanoma often blends into the normal skin and does not sharply delineate the mole from normal skin  Any mole that starts to demonstrate "uneven borders" should be examined promptly     Color: Healthy moles tend to be one color throughout  Melanomas tend to be made up of different colors ranging from dark black, blue, white, or red  Any mole that demonstrates a color change should be examined promptly    Diameter: Healthy moles tend to be smaller than 0 6 cm in size; an exception are "congenital nevi" that can be larger  Melanomas tend to grow and can often be greater than 0 6 cm (1/4 of an inch, or the size of a pencil eraser)  This is only a guideline, and many normal moles may be larger than 0 6 cm without being unhealthy  Any mole that starts to change in size (small to bigger or bigger to smaller) should be examined promptly    Evolving: Healthy moles tend to "stay the same "  Melanomas may often show signs of change or evolution such as a change in size, shape, color, or elevation  Any mole that starts to itch, bleed, crust, burn, hurt, or ulcerate or demonstrate a change or evolution should be examined promptly by a board certified dermatologist       What are atypical moles or dysplastic nevi? Dysplastic moles are moles that have some of the ABCDE  changes listed above but  are not cancerous  Sometimes a biopsy and microscopic examination are needed to determine the difference  They may indicate an increased risk of melanoma in that person, especially if there is a family history of melanoma  What is a Melanoma? The main concern when looking at a new or changing mole it to evaluate whether it may be a melanoma  The appearance of a "new mole" remains one of the most reliable methods for identifying a malignant melanoma     A melanoma is a type of skin cancer that can be deadly if it spreads throughout the body  The prognosis of a Melanoma depends on how deep it has penetrated in the skin  If caught early, they generally will not have had time to grow into the deeper layers of the skin and they cure rate is then very high  Once the melanoma grows deeper into the skin, the cure rate drops dramatically  Therefore, early detection and removal of a malignant melanoma results in a much better chance of complete cure  SEBORRHEIC KERATOSIS; NON-INFLAMED    Physical Exam:  • Anatomic Location Affected: Face   • Morphological Description:  Flat and raised, waxy, smooth to warty textured, yellow to brownish-grey to dark brown to blackish, discrete, "stuck-on" appearing papules  • Pertinent Positives:  • Pertinent Negatives: Additional History of Present Condition:  Patient reports new bumps on the skin  Denies itch, burn, pain, bleeding or ulceration  Present constantly; nothing seems to make it worse or better  No prior treatment  Assessment and Plan:  Based on a thorough discussion of this condition and the management approach to it (including a comprehensive discussion of the known risks, side effects and potential benefits of treatment), the patient (family) agrees to implement the following specific plan:  • Reassured benign   • Removal discussed if becomes bothersome $150 for up to 10     Seborrheic Keratosis  A seborrheic keratosis is a harmless warty spot that appears during adult life as a common sign of skin aging  Seborrheic keratoses can arise on any area of skin, covered or uncovered, with the usual exception of the palms and soles  They do not arise from mucous membranes  Seborrheic keratoses can have highly variable appearance  Seborrheic keratoses are extremely common  It has been estimated that over 90% of adults over the age of 61 years have one or more of them   They occur in males and females of all races, typically beginning to erupt in the 30s or 40s  They are uncommon under the age of 21 years  The precise cause of seborrhoeic keratoses is not known  Seborrhoeic keratoses are considered degenerative in nature  As time goes by, seborrheic keratoses tend to become more numerous  Some people inherit a tendency to develop a very large number of them; some people may have hundreds of them  The name "seborrheic keratosis" is misleading, because these lesions are not limited to a seborrhoeic distribution (scalp, mid-face, chest, upper back), nor are they formed from sebaceous glands, nor are they associated with sebum -- which is greasy  Seborrheic keratosis may also be called "SK," "Seb K," "basal cell papilloma," "senile wart," or "barnacle "      Researchers have noted:  • Eruptive seborrhoeic keratoses can follow sunburn or dermatitis  • Skin friction may be the reason they appear in body folds  • Viral cause (e g , human papillomavirus) seems unlikely  • Stable and clonal mutations or activation of FRFR3, PIK3CA, JOCELYNE, AKT1 and EGFR genes are found in seborrhoeic keratoses  • Seborrhoeic keratosis can arise from solar lentigo  • FRFR3 mutations also arise in solar lentigines  These mutations are associated with increased age and location on the head and neck, suggesting a role of ultraviolet radiation in these lesions  • Seborrheic keratoses do not harbour tumour suppressor gene mutations  • Epidermal growth factor receptor inhibitors, which are used to treat some cancers, often result in an increase in verrucal (warty) keratoses  There is no easy way to remove multiple lesions on a single occasion  Unless a specific lesion is "inflamed" and is causing pain or stinging/burning or is bleeding, most insurance companies do not authorize treatment      HARLEY ANGIOMA   Physical Exam:  • Anatomic Location Affected:  Trunk   • Morphological Description:  Red   • Pertinent Positives:  • Pertinent Negatives: Additional History of Present Condition:  Here for skin exam     Assessment and Plan:  Based on a thorough discussion of this condition and the management approach to it (including a comprehensive discussion of the known risks, side effects and potential benefits of treatment), the patient (family) agrees to implement the following specific plan:  • Reassured benign     LENTIGO    Physical Exam:  • Anatomic Location Affected:  Trunk, shoulders   • Morphological Description:  Several tan brown macules   • Pertinent Positives:  • Pertinent Negatives: Additional History of Present Condition:  Here for skin exam     Assessment and Plan:  Based on a thorough discussion of this condition and the management approach to it (including a comprehensive discussion of the known risks, side effects and potential benefits of treatment), the patient (family) agrees to implement the following specific plan:  • Monitor for changes     What is a lentigo? A lentigo is a pigmented flat or slightly raised lesion with a clearly defined edge  Unlike an ephelis (freckle), it does not fade in the winter months  There are several kinds of lentigo  The name lentigo originally referred to its appearance resembling a small lentil  The plural of lentigo is lentigines, although “lentigos” is also in common use  Who gets lentigines? Lentigines can affect males and females of all ages and races  Solar lentigines are especially prevalent in fair skinned adults  Lentigines associated with syndromes are present at birth or arise during childhood  What causes lentigines? Common forms of lentigo are due to exposure to ultraviolet radiation:  • Sun damage including sunburn   • Indoor tanning   • Phototherapy, especially photochemotherapy (PUVA)    Ionizing radiation, eg radiation therapy, can also cause lentigines    Several familial syndromes associated with widespread lentigines originate from mutations in Adarsh-MAP kinase, mTOR signaling and PTEN pathways  What are the clinical features of lentigines? Lentigines have been classified into several different types depending on what they look like, where they appear on the body, causative factors, and whether they are associated to other diseases or conditions  Lentigines may be solitary or more often, multiple  Most lentigines are smaller than 5 mm in diameter      Lentigo simplex  • A precursor to junctional naevus   • Arises during childhood and early adult life   • Found on trunk and limbs   • Small brown round or oval macule or thin plaque   • Jagged or smooth edge   • May have a dry surface   • May disappear in time  Solar lentigo  • A precursor to seborrhoeic keratosis   • Found on chronically sun exposed sites such as hands, face, lower legs   • May also follow sunburn to shoulders   • Yellow, light or dark brown regular or irregular macule or thin plaque   • May have a dry surface   • Often has moth-eaten outline   • Can slowly enlarge to several centimeters in diameter   • May disappear, often through the process known as lichenoid keratosis   • When atypical in appearance, may be difficult to distinguish from melanoma in situ  Ink spot lentigo  • Also known as reticulated lentigo   • Few in number compared to solar lentigines   • Follows sunburn in very fair skinned individuals   • Dark brown to black irregular ink spot-like macule  PUVA lentigo  • Similar to ink spot lentigo but follows photochemotherapy (PUVA)   • Location anywhere exposed to PUVA  Tanning bed lentigo  • Similar to ink spot lentigo but follows indoor tanning   • Location anywhere exposed to tanning bed  Radiation lentigo  • Occurs in site of irradiation (accidental or therapeutic)   • Associated with late-stage radiation dermatitis: epidermal atrophy, subcutaneous fibrosis, keratosis, telangiectasias  Melanotic macule  • Mucosal surfaces or adjacent glabrous skin eg lip, vulva, penis, anus   • Light to dark brown   • Also called mucosal melanosis  Generalised lentigines  • Found on any exposed or covered site from early childhood   • Small macules may merge to form larger patches   • Not associated with a syndrome   • Also called lentigines profusa, multiple lentigines  Agminated lentigines  • Naevoid eruption of lentigos confined to a single segmental area   • Sharp demarcation in midline   • May have associated neurological and developmental abnormalities  Patterned lentigines  • Inherited tendency to lentigines on face, lips, buttocks, palms, soles   • Recognised mainly in people of  ethnicity  Centrofacial neurodysraphic lentiginosis  Associated with mental retardation  Lentiginosis syndromes  • Syndromes include LEOPARD/[place], Peutz-Jeghers, Laugier-Hunziker, Moynahan, Xeroderma pigmentosum, myxoma syndromes (STAFFORD, NAME, Veliz), Ruvalcaba-Myhre-Childers, Bannayan-Zonnana syndrome, Cowden disease (multiple hamartoma syndrome )   • Inheritance is autosomal dominant; sporadic cases common   • Widespread lentigines present at birth or arise in early childhood   • Associated with neural, endocrine, and mesenchymal tumors    How is the diagnosis made? Lentigines are usually diagnosed clinically by their typical appearance  Concern regarding possibility of melanoma may lead to:  • Dermatoscopy   • Diagnostic excision biopsy    Histopathology of a lentigo shows:  • Thickened epidermis   • An increased number of melanocytes along the basal layer of epidermis   • Unlike junctional melanocytic naevus, there are no nests of melanocytes   • Increased melanin pigment within the keratinocytes   • Additional features depending on type of lentigo    In contrast, an ephelis (freckle) shows sun-induced increased melanin within the keratinocytes, without an increase in number of cells  What is the treatment for lentigines? Most lentigines are left alone  Attempts to lighten them may not be successful   The following approaches are used:  • SPF 50+ broad-spectrum sunscreen   • Hydroquinone bleaching cream   • Alpha hydroxy acids   • Vitamin C   • Retinoids   • Azelaic acid   • Chemical peels  Individual lesions can be permanently removed using:  • Cryotherapy   • Intense pulsed light   • Pigment lasers    How can lentigines be prevented? Lentigines associated with exposure ultraviolet radiation can be prevented by very careful sun protection  Clothing is more successful at preventing new lentigines than are sunscreens  What is the outlook for lentigines? Lentigines usually persist  They may increase in number with age and sun exposure  Some in sun-protected sites may fade and disappear      Scribe Attestation    I,:  Zaid Urban am acting as a scribe while in the presence of the attending physician :       I,:  Hanna Marx MD personally performed the services described in this documentation    as scribed in my presence :

## 2023-03-07 NOTE — PATIENT INSTRUCTIONS
MELANOCYTIC NEVI ("Moles")  Assessment and Plan:  Based on a thorough discussion of this condition and the management approach to it (including a comprehensive discussion of the known risks, side effects and potential benefits of treatment), the patient (family) agrees to implement the following specific plan:  Monitor for changes   When outside we recommend using a wide brim hat, sunglasses, long sleeve and pants, sunscreen with SPF 47+ with reapplication every 2 hours, or SPF specific clothing   Routine skin exam recommended      Melanocytic Nevi  Melanocytic nevi ("moles") are caused by collections of the color producing skin cells, or melanocytes, in 1 area in the skin  They can range in color from pink to dark brown and be either raised or flat  Some moles are present at birth (I e , "congenital nevi"), while others come up later in life (i e , "acquired nevi")  Debe Rhodelia exposure also stimulates the body to make more moles, ie the more sun you get the more moles you'll grow  Clinically distinguishing a healthy mole from melanoma may be difficult  The "ABCDE's" of moles have been suggested as a means of helping to alert a person to a suspicious mole and the possible increased risk of melanoma  Asymmetry: Healthy moles tend to be symmetric, while melanomas are often asymmetric  Asymmetry means if you draw a line through the mole, the two halves do not match in color, size, shape, or surface texture Any mole that starts to demonstrate "asymmetry" should be examined promptly by a board certified dermatologist      Border: Healthy moles tend to have discrete, even borders  The border of a melanoma often blends into the normal skin and does not sharply delineate the mole from normal skin  Any mole that starts to demonstrate "uneven borders" should be examined promptly     Color: Healthy moles tend to be one color throughout    Melanomas tend to be made up of different colors ranging from dark black, blue, white, or red  Any mole that demonstrates a color change should be examined promptly    Diameter: Healthy moles tend to be smaller than 0 6 cm in size; an exception are "congenital nevi" that can be larger  Melanomas tend to grow and can often be greater than 0 6 cm (1/4 of an inch, or the size of a pencil eraser)  This is only a guideline, and many normal moles may be larger than 0 6 cm without being unhealthy  Any mole that starts to change in size (small to bigger or bigger to smaller) should be examined promptly    Evolving: Healthy moles tend to "stay the same "  Melanomas may often show signs of change or evolution such as a change in size, shape, color, or elevation  Any mole that starts to itch, bleed, crust, burn, hurt, or ulcerate or demonstrate a change or evolution should be examined promptly by a board certified dermatologist       Dylon Harris; NON-INFLAMED    Assessment and Plan:  Based on a thorough discussion of this condition and the management approach to it (including a comprehensive discussion of the known risks, side effects and potential benefits of treatment), the patient (family) agrees to implement the following specific plan:  Reassured benign   Removal discussed if becomes bothersome $150 for up to 10     Seborrheic Keratosis  A seborrheic keratosis is a harmless warty spot that appears during adult life as a common sign of skin aging  Seborrheic keratoses can arise on any area of skin, covered or uncovered, with the usual exception of the palms and soles  They do not arise from mucous membranes  Seborrheic keratoses can have highly variable appearance  Seborrheic keratoses are extremely common  It has been estimated that over 90% of adults over the age of 61 years have one or more of them  They occur in males and females of all races, typically beginning to erupt in the 35s or 45s  They are uncommon under the age of 21 years    The precise cause of seborrhoeic keratoses is not known  Seborrhoeic keratoses are considered degenerative in nature  As time goes by, seborrheic keratoses tend to become more numerous  Some people inherit a tendency to develop a very large number of them; some people may have hundreds of them  CHERRY ANGIOMA     Assessment and Plan:  Based on a thorough discussion of this condition and the management approach to it (including a comprehensive discussion of the known risks, side effects and potential benefits of treatment), the patient (family) agrees to implement the following specific plan:  Reassured benign   LENTIGO    Assessment and Plan:  Based on a thorough discussion of this condition and the management approach to it (including a comprehensive discussion of the known risks, side effects and potential benefits of treatment), the patient (family) agrees to implement the following specific plan:  Monitor for changes     What is a lentigo? A lentigo is a pigmented flat or slightly raised lesion with a clearly defined edge  Unlike an ephelis (freckle), it does not fade in the winter months  There are several kinds of lentigo  The name lentigo originally referred to its appearance resembling a small lentil  The plural of lentigo is lentigines, although “lentigos” is also in common use  Who gets lentigines? Lentigines can affect males and females of all ages and races  Solar lentigines are especially prevalent in fair skinned adults  Lentigines associated with syndromes are present at birth or arise during childhood

## 2023-03-08 ENCOUNTER — APPOINTMENT (OUTPATIENT)
Dept: LAB | Facility: HOSPITAL | Age: 68
End: 2023-03-08

## 2023-03-08 DIAGNOSIS — Z00.00 WELL ADULT EXAM: ICD-10-CM

## 2023-03-08 LAB
ALBUMIN SERPL BCP-MCNC: 4.1 G/DL (ref 3.5–5)
ALP SERPL-CCNC: 88 U/L (ref 34–104)
ALT SERPL W P-5'-P-CCNC: 16 U/L (ref 7–52)
ANION GAP SERPL CALCULATED.3IONS-SCNC: 8 MMOL/L (ref 4–13)
AST SERPL W P-5'-P-CCNC: 18 U/L (ref 13–39)
BILIRUB SERPL-MCNC: 0.69 MG/DL (ref 0.2–1)
BUN SERPL-MCNC: 18 MG/DL (ref 5–25)
CALCIUM SERPL-MCNC: 9.2 MG/DL (ref 8.4–10.2)
CHLORIDE SERPL-SCNC: 104 MMOL/L (ref 96–108)
CHOLEST SERPL-MCNC: 184 MG/DL
CO2 SERPL-SCNC: 29 MMOL/L (ref 21–32)
CREAT SERPL-MCNC: 0.55 MG/DL (ref 0.6–1.3)
ERYTHROCYTE [DISTWIDTH] IN BLOOD BY AUTOMATED COUNT: 12.6 % (ref 11.6–15.1)
GFR SERPL CREATININE-BSD FRML MDRD: 98 ML/MIN/1.73SQ M
GLUCOSE P FAST SERPL-MCNC: 106 MG/DL (ref 65–99)
HCT VFR BLD AUTO: 41.9 % (ref 34.8–46.1)
HDLC SERPL-MCNC: 63 MG/DL
HGB BLD-MCNC: 13.6 G/DL (ref 11.5–15.4)
LDLC SERPL CALC-MCNC: 100 MG/DL (ref 0–100)
MCH RBC QN AUTO: 29.1 PG (ref 26.8–34.3)
MCHC RBC AUTO-ENTMCNC: 32.5 G/DL (ref 31.4–37.4)
MCV RBC AUTO: 90 FL (ref 82–98)
NONHDLC SERPL-MCNC: 121 MG/DL
PLATELET # BLD AUTO: 315 THOUSANDS/UL (ref 149–390)
PMV BLD AUTO: 9.8 FL (ref 8.9–12.7)
POTASSIUM SERPL-SCNC: 4.8 MMOL/L (ref 3.5–5.3)
PROT SERPL-MCNC: 7.2 G/DL (ref 6.4–8.4)
RBC # BLD AUTO: 4.67 MILLION/UL (ref 3.81–5.12)
SODIUM SERPL-SCNC: 141 MMOL/L (ref 135–147)
TRIGL SERPL-MCNC: 104 MG/DL
TSH SERPL DL<=0.05 MIU/L-ACNC: 2.95 UIU/ML (ref 0.45–4.5)
WBC # BLD AUTO: 7.23 THOUSAND/UL (ref 4.31–10.16)

## 2023-03-16 ENCOUNTER — OFFICE VISIT (OUTPATIENT)
Dept: FAMILY MEDICINE CLINIC | Facility: CLINIC | Age: 68
End: 2023-03-16

## 2023-03-16 VITALS
DIASTOLIC BLOOD PRESSURE: 62 MMHG | OXYGEN SATURATION: 100 % | WEIGHT: 136 LBS | TEMPERATURE: 97.1 F | RESPIRATION RATE: 18 BRPM | BODY MASS INDEX: 21.35 KG/M2 | HEIGHT: 67 IN | SYSTOLIC BLOOD PRESSURE: 121 MMHG | HEART RATE: 76 BPM

## 2023-03-16 DIAGNOSIS — R22.1 PALPABLE MASS OF NECK: Primary | ICD-10-CM

## 2023-03-16 DIAGNOSIS — M81.0 POSTMENOPAUSAL BONE LOSS: ICD-10-CM

## 2023-03-16 DIAGNOSIS — Z23 ENCOUNTER FOR IMMUNIZATION: ICD-10-CM

## 2023-03-19 NOTE — PATIENT INSTRUCTIONS
This patient's physical exam is within normal limits  She does have a palpable mass on the lower lobe of her left thyroid  Patient was given an order for an ultrasound of the left thyroid  The patient had a Chem-20 and lipid  We will long the thyroid studies done prior to the exam and the results were all normal   Patient was given a pneumococcal 20 vaccination  She is up-to-date with all the rest of her vaccination  Yearly medical exam is recommended  I will call the patient with the results of the ultrasound of the thyroid

## 2023-03-19 NOTE — PROGRESS NOTES
Name: Laila Brown      : 1955      MRN: [id number]  Encounter Provider: Remigio Etienne DO  Encounter Date: 3/16/2023   Encounter department: True     1  Palpable mass of neck  -     US thyroid; Future; Expected date: 2023    2  Postmenopausal bone loss  -     DXA bone density spine hip and pelvis; Future; Expected date: 2023    3  Encounter for immunization  -     Pneumococcal Conjugate Vaccine 20-valent (Pcv20)           Subjective      This is a scheduled well physical exam for this 58-year-old female  She also states a lump noticed on her left anterior neck just below her thyroid  She states the lump has been there for approximately 3 to 5 months and getting bigger  She denies any pain associated with this lump  She denies any other medical problems  Review of Systems   HENT: Negative  Palpable lump just below the lower lobe of her left-sided thyroid  Eyes: Negative  Respiratory: Negative  Cardiovascular: Negative  Gastrointestinal: Negative  Endocrine: Negative  Musculoskeletal: Negative  Allergic/Immunologic: Negative  Neurological: Negative  Hematological: Negative  Psychiatric/Behavioral: Negative  All other systems reviewed and are negative  Current Outpatient Medications on File Prior to Visit   Medication Sig   • Multiple Vitamins-Minerals (CENTRUM SILVER 50+WOMEN PO) Take by mouth       Objective     /62 (BP Location: Left arm, Patient Position: Sitting, Cuff Size: Standard)   Pulse 76   Temp (!) 97 1 °F (36 2 °C) (Tympanic Core)   Resp 18   Ht 5' 7" (1 702 m)   Wt 61 7 kg (136 lb)   SpO2 100%   BMI 21 30 kg/m²     Physical Exam  Vitals reviewed  Constitutional:       Appearance: Normal appearance     HENT:      Right Ear: Tympanic membrane normal       Left Ear: Tympanic membrane and external ear normal    Neck:      Comments: Palpable less than 1 cm mass just below the lower lobe of her left thyroid  Cardiovascular:      Rate and Rhythm: Normal rate and regular rhythm  Heart sounds: Normal heart sounds  Pulmonary:      Effort: Pulmonary effort is normal       Breath sounds: Normal breath sounds  Abdominal:      General: Abdomen is flat  Bowel sounds are normal       Palpations: Abdomen is soft  Musculoskeletal:         General: Normal range of motion  Skin:     General: Skin is dry  Neurological:      General: No focal deficit present  Mental Status: She is alert  Psychiatric:         Mood and Affect: Mood normal          Behavior: Behavior normal          Thought Content:  Thought content normal          Judgment: Judgment normal        Shaea Finder, DO

## 2023-03-20 ENCOUNTER — HOSPITAL ENCOUNTER (OUTPATIENT)
Dept: RADIOLOGY | Facility: HOSPITAL | Age: 68
Discharge: HOME | End: 2023-03-20
Attending: FAMILY MEDICINE

## 2023-03-20 DIAGNOSIS — R22.1 PALPABLE MASS OF NECK: ICD-10-CM

## 2023-04-05 DIAGNOSIS — E04.1 NODULE OF LEFT LOBE OF THYROID GLAND: Primary | ICD-10-CM

## 2023-05-04 ENCOUNTER — OFFICE VISIT (OUTPATIENT)
Dept: LAB | Facility: HOSPITAL | Age: 68
End: 2023-05-04

## 2023-05-04 ENCOUNTER — CONSULT (OUTPATIENT)
Dept: SURGERY | Facility: CLINIC | Age: 68
End: 2023-05-04

## 2023-05-04 VITALS
HEIGHT: 67 IN | OXYGEN SATURATION: 98 % | BODY MASS INDEX: 21.56 KG/M2 | DIASTOLIC BLOOD PRESSURE: 65 MMHG | SYSTOLIC BLOOD PRESSURE: 114 MMHG | WEIGHT: 137.4 LBS | TEMPERATURE: 96.9 F | HEART RATE: 74 BPM

## 2023-05-04 DIAGNOSIS — Z01.818 PREOPERATIVE EXAMINATION: ICD-10-CM

## 2023-05-04 DIAGNOSIS — E04.1 LEFT THYROID NODULE: ICD-10-CM

## 2023-05-04 DIAGNOSIS — D49.7 THYROID NEOPLASM: ICD-10-CM

## 2023-05-04 DIAGNOSIS — Z01.818 PREOPERATIVE EXAMINATION: Primary | ICD-10-CM

## 2023-05-04 LAB
ATRIAL RATE: 72 BPM
P AXIS: 55 DEGREES
PR INTERVAL: 170 MS
QRS AXIS: 59 DEGREES
QRSD INTERVAL: 82 MS
QT INTERVAL: 412 MS
QTC INTERVAL: 451 MS
T WAVE AXIS: 42 DEGREES
VENTRICULAR RATE: 72 BPM

## 2023-05-04 RX ORDER — CEFAZOLIN SODIUM 1 G/50ML
1000 SOLUTION INTRAVENOUS ONCE
OUTPATIENT
Start: 2023-05-04 | End: 2023-05-04

## 2023-05-04 NOTE — H&P
"[facility] History and Physical Note:    Assessment:  [place] category 4 left thyroid nodule; normal right thyroid lobe  Afirma testing \"suspicious\" carrying 50% chance of malignancy  Pertinent labs reviewed  CMP, CBC, and TSH third generation is normal on 8 March 2023  I reviewed the pathology report from the left thyroid nodule FNA  Pertinent images and available reads personally reviewed  Reviewed the thyroid ultrasound images as well as ultrasound report  Pertinent notes reviewed  I reviewed the postbiopsy note    Plan:  Left thyroid lobectomy  I discussed this at length with the patient including optional total thyroidectomy now versus lower likelihood of completion thyroidectomy depending upon pathology report  She understands, desires to proceed  Preoperative laboratory testing already performed  Will order EKG  Chief Complaint:  \"I am here for the thyroid\"    HPI    Patient is a 55-year-old woman who is evaluated by her PCP, Dr Alden Mccullough, for a palpable mass in the left neck  She underwent an ultrasound of her thyroid for a left nodule  Ultrasound was performed on 20 March 2023:  FINDINGS:  Normal homogeneous smooth echotexture      Right lobe: 3 9 x 1 1 x 1 2 cm  Volume 2 6 mL  Left lobe:  5 4 x 1 6 x 2 1 cm  Volume 8 9 mL  Isthmus: 0 2  cm      Nodule #1  Image 65  LEFT lower pole nodule measuring 2 8 x 1 7 x 2 2 cm  No prior  COMPOSITION:  2 points, solid or almost completely solid   ECHOGENICITY:  1 point, hyperechoic or isoechoic  SHAPE:  0 points, wider-than-tall  MARGIN: 0 points, smooth  ECHOGENIC FOCI:  0 points, none or large comet-tail artifacts  TI-RADS Classification: TR 3 (3 points), FNA if >2 5 cm  Follow if >1 5 cm      There are colloid cysts and nodules of lesser size and/or TI-RADS score    These do not necessitate additional evaluation based on ACR criteria       IMPRESSION:     Recommend tissue sampling of the left lower pole nodule " "described above  FNA of the left lower lobe nodule was performed on 18 April 2023:  IMPRESSION:  Status post successful ultrasound-guided thyroid biopsy  Final pathology results are pending  Additional FNA samples were obtained and reserved for Afirma testing, if required  Pathology report:  Final Diagnosis   A  & B  Thyroid, Left, lower pole: (Thin-Prep and smears)  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)  - See note  Atypical follicular cells with crowding/overlap, microfollicular patterns, and occasional intranuclear inclusions, grooves, and nucleoli      Satisfactory for evaluation      Note:  (1) As reported in the [address] [place] [address] for Reporting Thyroid Cytopathology* this diagnostic category has demonstrated anywhere from 25-40% risk of malignancy being found in subsequent resections and/or FNA  This risk of malignancy is expected to change due to the usage of the surgical pathology diagnosis of non-invasive follicular thyroid neoplasm with papillary-like nuclear features (NIFTP)    The anticipated risk of malignancy secondary to NIFTP is 10-40%  The histologic follow-up of cases diagnosed as follicular neoplasm/suspicious for follicular neoplasm includes follicular adenoma, follicular carcinoma, and follicular variant of papillary thyroid carcinoma including the recently described indolent counterpart, NIFTP  The manual reports that the usual management following this diagnosis is genetic testing or lobectomy  Ultimately, clinical/imaging correlation for this patient is needed in arriving at the actual management plan       Afirma testing was performed and is \"suspicious\" caring approximately 50% chance of malignancy        PMH:  No past medical history on file      PSH:  Past Surgical History:   Procedure Laterality Date    HEMORRHOID SURGERY      1995    HYSTERECTOMY      supracervical / [address]      x2 [address]" bone spur [address]  [facility] THYROID BIOPSY  4/18/2023       Home Meds:  Current Outpatient Medications on File Prior to Visit   Medication Sig Dispense Refill    Multiple Vitamins-Minerals (CENTRUM SILVER 50+WOMEN PO) Take by mouth       No current facility-administered medications on file prior to visit  Allergies:  No Known Allergies    Social Hx:  Social History     Socioeconomic History    Marital status: /Civil Union     Spouse name: Not on file    Number of children: Not on file    Years of education: Not on file    Highest education level: Not on file   Occupational History    Not on file   Tobacco Use    Smoking status: Never    Smokeless tobacco: Never   Vaping Use    Vaping Use: Never used   Substance and Sexual Activity    Alcohol use: Yes     Comment: occasional alcohol use     Drug use: No    Sexual activity: Not Currently     Partners: Male     Birth control/protection: Post-menopausal     Comment: declines STD / HIV testing    Other Topics Concern    Not on file   Social History Narrative    Feels safe at home     Social Determinants of Health     Financial Resource Strain: Not on file   Food Insecurity: Not on file   Transportation Needs: Not on file   Physical Activity: Not on file   Stress: Not on file   Social Connections: Not on file   Intimate Partner Violence: Not on file   Housing Stability: Not on file        Family Hx:    Family History   Problem Relation Age of Onset    Uterine cancer Mother     Cancer Mother     Heart disease Father         cardiac disorder     Diabetes Father     Other Sister         hip arthrosis     Uterine cancer Maternal Aunt     Uterine cancer Maternal Aunt     No Known Problems Paternal Aunt     No Known Problems Paternal Aunt     No Known Problems Paternal Aunt     No Known Problems Paternal Aunt     Cancer Paternal Aunt          Review of Systems   Constitutional: Negative for chills and fever  HENT: Negative  "  Respiratory: Negative  Cardiovascular: Negative  Gastrointestinal: Negative  Genitourinary: Negative  Musculoskeletal: Negative  Neurological: Negative  Hematological: Negative  All other systems reviewed and are negative  /65 (BP Location: Left arm, Patient Position: Sitting, Cuff Size: Standard)   Pulse 74   Temp (!) 96 9 °F (36 1 °C) (Core)   Ht 5' 7\" (1 702 m)   Wt 62 3 kg (137 lb 6 4 oz)   SpO2 98%   BMI 21 52 kg/m²     Physical Exam  Vitals reviewed  Constitutional:       General: She is not in acute distress  Appearance: Normal appearance  HENT:      Head: Normocephalic  Eyes:      Pupils: Pupils are equal, round, and reactive to light  Neck:      Comments: Left lower lobe thyroid nodule palpable and mobile  Cardiovascular:      Rate and Rhythm: Normal rate and regular rhythm  Pulmonary:      Effort: Pulmonary effort is normal  No respiratory distress  Breath sounds: Normal breath sounds  Abdominal:      General: Abdomen is flat  There is no distension  Palpations: Abdomen is soft  Musculoskeletal:         General: Normal range of motion  Cervical back: Normal range of motion and neck supple  Lymphadenopathy:      Cervical: No cervical adenopathy  Skin:     General: Skin is warm and dry  Neurological:      Mental Status: She is alert           Pertinent labs reviewed  CMP, CBC, and TSH third generation is normal on 8 March 2023  I reviewed the pathology report from the left thyroid nodule FNA  Pertinent images and available reads personally reviewed  Reviewed the thyroid ultrasound images as well as ultrasound report  Pertinent notes reviewed  I reviewed the postbiopsy note     Luana Madison MD [address] [facility]  [phone]            "

## 2023-05-04 NOTE — PROGRESS NOTES
"[facility] History and Physical Note:    Assessment:  [place] category 4 left thyroid nodule; normal right thyroid lobe  Afirma testing \"suspicious\" carrying 50% chance of malignancy  Pertinent labs reviewed  CMP, CBC, and TSH third generation is normal on 8 March 2023  I reviewed the pathology report from the left thyroid nodule FNA  Pertinent images and available reads personally reviewed  Reviewed the thyroid ultrasound images as well as ultrasound report  Pertinent notes reviewed  I reviewed the postbiopsy note    Plan:  Left thyroid lobectomy  I discussed this at length with the patient including optional total thyroidectomy now versus lower likelihood of completion thyroidectomy depending upon pathology report  She understands, desires to proceed  Preoperative laboratory testing already performed  Will order EKG  Chief Complaint:  \"I am here for the thyroid\"    HPI    Patient is a 52-year-old woman who is evaluated by her PCP, Dr Kami Stratton, for a palpable mass in the left neck  She underwent an ultrasound of her thyroid for a left nodule  Ultrasound was performed on 20 March 2023:  FINDINGS:  Normal homogeneous smooth echotexture      Right lobe: 3 9 x 1 1 x 1 2 cm  Volume 2 6 mL  Left lobe:  5 4 x 1 6 x 2 1 cm  Volume 8 9 mL  Isthmus: 0 2  cm      Nodule #1  Image 65  LEFT lower pole nodule measuring 2 8 x 1 7 x 2 2 cm  No prior  COMPOSITION:  2 points, solid or almost completely solid   ECHOGENICITY:  1 point, hyperechoic or isoechoic  SHAPE:  0 points, wider-than-tall  MARGIN: 0 points, smooth  ECHOGENIC FOCI:  0 points, none or large comet-tail artifacts  TI-RADS Classification: TR 3 (3 points), FNA if >2 5 cm  Follow if >1 5 cm      There are colloid cysts and nodules of lesser size and/or TI-RADS score    These do not necessitate additional evaluation based on ACR criteria       IMPRESSION:     Recommend tissue sampling of the left lower pole nodule " "described above  FNA of the left lower lobe nodule was performed on 18 April 2023:  IMPRESSION:  Status post successful ultrasound-guided thyroid biopsy  Final pathology results are pending  Additional FNA samples were obtained and reserved for Afirma testing, if required  Pathology report:  Final Diagnosis   A  & B  Thyroid, Left, lower pole: (Thin-Prep and smears)  Follicular neoplasm/Suspicious for follicular neoplasm ([place] Category IV)  - See note  Atypical follicular cells with crowding/overlap, microfollicular patterns, and occasional intranuclear inclusions, grooves, and nucleoli      Satisfactory for evaluation      Note:  (1) As reported in the [address] [place] [address] for Reporting Thyroid Cytopathology* this diagnostic category has demonstrated anywhere from 25-40% risk of malignancy being found in subsequent resections and/or FNA  This risk of malignancy is expected to change due to the usage of the surgical pathology diagnosis of non-invasive follicular thyroid neoplasm with papillary-like nuclear features (NIFTP)    The anticipated risk of malignancy secondary to NIFTP is 10-40%  The histologic follow-up of cases diagnosed as follicular neoplasm/suspicious for follicular neoplasm includes follicular adenoma, follicular carcinoma, and follicular variant of papillary thyroid carcinoma including the recently described indolent counterpart, NIFTP  The manual reports that the usual management following this diagnosis is genetic testing or lobectomy  Ultimately, clinical/imaging correlation for this patient is needed in arriving at the actual management plan       Afirma testing was performed and is \"suspicious\" caring approximately 50% chance of malignancy        PMH:  No past medical history on file      PSH:  Past Surgical History:   Procedure Laterality Date    HEMORRHOID SURGERY      1995    HYSTERECTOMY      supracervical / [address]      x2 [address] [facility]" bone spur [address]  [facility] THYROID BIOPSY  4/18/2023       Home Meds:  Current Outpatient Medications on File Prior to Visit   Medication Sig Dispense Refill    Multiple Vitamins-Minerals (CENTRUM SILVER 50+WOMEN PO) Take by mouth       No current facility-administered medications on file prior to visit  Allergies:  No Known Allergies    Social Hx:  Social History     Socioeconomic History    Marital status: /Civil Union     Spouse name: Not on file    Number of children: Not on file    Years of education: Not on file    Highest education level: Not on file   Occupational History    Not on file   Tobacco Use    Smoking status: Never    Smokeless tobacco: Never   Vaping Use    Vaping Use: Never used   Substance and Sexual Activity    Alcohol use: Yes     Comment: occasional alcohol use     Drug use: No    Sexual activity: Not Currently     Partners: Male     Birth control/protection: Post-menopausal     Comment: declines STD / HIV testing    Other Topics Concern    Not on file   Social History Narrative    Feels safe at home     Social Determinants of Health     Financial Resource Strain: Not on file   Food Insecurity: Not on file   Transportation Needs: Not on file   Physical Activity: Not on file   Stress: Not on file   Social Connections: Not on file   Intimate Partner Violence: Not on file   Housing Stability: Not on file        Family Hx:    Family History   Problem Relation Age of Onset    Uterine cancer Mother     Cancer Mother     Heart disease Father         cardiac disorder     Diabetes Father     Other Sister         hip arthrosis     Uterine cancer Maternal Aunt     Uterine cancer Maternal Aunt     No Known Problems Paternal Aunt     No Known Problems Paternal Aunt     No Known Problems Paternal Aunt     No Known Problems Paternal Aunt     Cancer Paternal Aunt          Review of Systems   Constitutional: Negative for chills and fever  HENT: Negative  "  Respiratory: Negative  Cardiovascular: Negative  Gastrointestinal: Negative  Genitourinary: Negative  Musculoskeletal: Negative  Neurological: Negative  Hematological: Negative  All other systems reviewed and are negative  /65 (BP Location: Left arm, Patient Position: Sitting, Cuff Size: Standard)   Pulse 74   Temp (!) 96 9 °F (36 1 °C) (Core)   Ht 5' 7\" (1 702 m)   Wt 62 3 kg (137 lb 6 4 oz)   SpO2 98%   BMI 21 52 kg/m²     Physical Exam  Vitals reviewed  Constitutional:       General: She is not in acute distress  Appearance: Normal appearance  HENT:      Head: Normocephalic  Eyes:      Pupils: Pupils are equal, round, and reactive to light  Neck:      Comments: Left lower lobe thyroid nodule palpable and mobile  Cardiovascular:      Rate and Rhythm: Normal rate and regular rhythm  Pulmonary:      Effort: Pulmonary effort is normal  No respiratory distress  Breath sounds: Normal breath sounds  Abdominal:      General: Abdomen is flat  There is no distension  Palpations: Abdomen is soft  Musculoskeletal:         General: Normal range of motion  Cervical back: Normal range of motion and neck supple  Lymphadenopathy:      Cervical: No cervical adenopathy  Skin:     General: Skin is warm and dry  Neurological:      Mental Status: She is alert           Pertinent labs reviewed  CMP, CBC, and TSH third generation is normal on 8 March 2023  I reviewed the pathology report from the left thyroid nodule FNA  Pertinent images and available reads personally reviewed  Reviewed the thyroid ultrasound images as well as ultrasound report  Pertinent notes reviewed  I reviewed the postbiopsy note     Faby Dalal MD [address] [facility]  [phone]        "

## 2023-05-08 ENCOUNTER — ANESTHESIA EVENT (OUTPATIENT)
Dept: PERIOP | Facility: HOSPITAL | Age: 68
End: 2023-05-08

## 2023-05-08 PROBLEM — Z90.710 HISTORY OF HYSTERECTOMY: Status: ACTIVE | Noted: 2023-05-08

## 2023-05-08 NOTE — ANESTHESIA PREPROCEDURE EVALUATION
Procedure:  LOBECTOMY THYROID (Left: Neck)    Relevant Problems   GYN   (+) History of hysterectomy      MUSCULOSKELETAL   (+) Cervical myofascial pain syndrome   (+) Left-sided low back pain with left-sided sciatica      NEURO/PSYCH   (+) Cervical myofascial pain syndrome        Physical Exam    Airway    Mallampati score: III  TM Distance: >3 FB  Neck ROM: full     Dental       Cardiovascular  Rhythm: regular, Rate: normal,     Pulmonary  Breath sounds clear to auscultation,     Other Findings        Anesthesia Plan  ASA Score- 2     Anesthesia Type- general with ASA Monitors  Additional Monitors:   Airway Plan: ETT  Comment: Possible NIMS tube  Plan Factors-    Chart reviewed  Patient is not a current smoker  Induction- intravenous  Postoperative Plan- Plan for postoperative opioid use  Informed Consent- Anesthetic plan and risks discussed with patient  I personally reviewed this patient with the CRNA  Discussed and agreed on the Anesthesia Plan with the CRNA  Chelly Leon

## 2023-05-08 NOTE — PRE-PROCEDURE INSTRUCTIONS
My Surgical Experience    The following information was developed to assist you to prepare for your operation  What do I need to do before coming to the hospital?  • Arrange for a responsible person to drive you to and from the hospital   • Arrange care for your children at home  Children are not allowed in the recovery areas of the hospital  • Plan to wear clothing that is easy to put on and take off  If you are having shoulder surgery, wear a shirt that buttons or zippers in the front  Bathing  o Shower the evening before and the morning of your surgery with an antibacterial soap  Please refer to the Pre Op Showering Instructions for Surgery Patients Sheet   o Remove nail polish and all body piercing jewelry  o Do not shave any body part for at least 24 hours before surgery-this includes face, arms, legs and upper body  Food  o Nothing to eat or drink after midnight the night before your surgery  This includes candy and chewing gum  o Exception: If your surgery is after 12:00pm (noon), you may have clear liquids such as 7-Up®, ginger ale, apple or cranberry juice, Jell-O®, water, or clear broth until 8:00 am  o Do not drink milk or juice with pulp on the morning before surgery  o Do not drink alcohol 24 hours before surgery  Medicine  o Follow instructions you received from your surgeon about which medicines you may take on the day of surgery  o If instructed to take medicine on the morning of surgery, take pills with just a small sip of water  Call your prescribing doctor for specific infroamtion on what to do if you take insulin    What should I bring to the hospital?    Bring:  • Crutches or a walker, if you have them, for foot or knee surgery  • A list of the daily medicines, vitamins, minerals, herbals and nutritional supplements you take   Include the dosages of medicines and the time you take them each day  • Glasses, dentures or hearing aids  • Minimal clothing; you will be wearing hospital sleepwear  • Photo ID; required to verify your identity  • If you have a Living Will or Power of , bring a copy of the documents  • If you have an ostomy, bring an extra pouch and any supplies you use    Do not bring  • Medicines or inhalers  • Money, valuables or jewelry    What other information should I know about the day of surgery? • Notify your surgeons if you develop a cold, sore throat, cough, fever, rash or any other illness  • Report to the Ambulatory Surgical/Same Day Surgery Unit  • You will be instructed to stop at Registration only if you have not been pre-registered  • Inform your  fi they do not stay that they will be asked by the staff to leave a phone number where they can be reached  • Be available to be reached before surgery  In the event the operating room schedule changes, you may be asked to come in earlier or later than expected    *It is important to tell your doctor and others involved in your health care if you are taking or have been taking any non-prescription drugs, vitamins, minerals, herbals or other nutritional supplements  Any of these may interact with some food or medicines and cause a reaction      Pre-Surgery Instructions:   Medication Instructions   • Multiple Vitamins-Minerals (CENTRUM SILVER 50+WOMEN PO) Hold day of surgery

## 2023-05-10 ENCOUNTER — HOSPITAL ENCOUNTER (OUTPATIENT)
Facility: HOSPITAL | Age: 68
Setting detail: OUTPATIENT SURGERY
Discharge: HOME | End: 2023-05-10
Attending: SURGERY | Admitting: SURGERY

## 2023-05-10 ENCOUNTER — ANESTHESIA (OUTPATIENT)
Dept: PERIOP | Facility: HOSPITAL | Age: 68
End: 2023-05-10

## 2023-05-10 VITALS
DIASTOLIC BLOOD PRESSURE: 62 MMHG | SYSTOLIC BLOOD PRESSURE: 115 MMHG | HEART RATE: 88 BPM | BODY MASS INDEX: 20.93 KG/M2 | OXYGEN SATURATION: 95 % | HEIGHT: 67 IN | RESPIRATION RATE: 16 BRPM | WEIGHT: 133.38 LBS | TEMPERATURE: 98 F

## 2023-05-10 DIAGNOSIS — E04.1 LEFT THYROID NODULE: ICD-10-CM

## 2023-05-10 RX ORDER — OXYCODONE HYDROCHLORIDE AND ACETAMINOPHEN 5; 325 MG/1; MG/1
1 TABLET ORAL EVERY 6 HOURS PRN
Qty: 6 TABLET | Refills: 0 | Status: SHIPPED | OUTPATIENT
Start: 2023-05-10

## 2023-05-10 RX ORDER — MAGNESIUM HYDROXIDE 1200 MG/15ML
LIQUID ORAL AS NEEDED
Status: DISCONTINUED | OUTPATIENT
Start: 2023-05-10 | End: 2023-05-10 | Stop reason: HOSPADM

## 2023-05-10 RX ORDER — MIDAZOLAM HYDROCHLORIDE 2 MG/2ML
INJECTION, SOLUTION INTRAMUSCULAR; INTRAVENOUS AS NEEDED
Status: DISCONTINUED | OUTPATIENT
Start: 2023-05-10 | End: 2023-05-10

## 2023-05-10 RX ORDER — ONDANSETRON 2 MG/ML
4 INJECTION INTRAMUSCULAR; INTRAVENOUS ONCE AS NEEDED
Status: DISCONTINUED | OUTPATIENT
Start: 2023-05-10 | End: 2023-05-10 | Stop reason: HOSPADM

## 2023-05-10 RX ORDER — PROPOFOL 10 MG/ML
INJECTION, EMULSION INTRAVENOUS AS NEEDED
Status: DISCONTINUED | OUTPATIENT
Start: 2023-05-10 | End: 2023-05-10

## 2023-05-10 RX ORDER — BUPIVACAINE HYDROCHLORIDE AND EPINEPHRINE 5; 5 MG/ML; UG/ML
INJECTION, SOLUTION EPIDURAL; INTRACAUDAL; PERINEURAL AS NEEDED
Status: DISCONTINUED | OUTPATIENT
Start: 2023-05-10 | End: 2023-05-10 | Stop reason: HOSPADM

## 2023-05-10 RX ORDER — HYDROMORPHONE HCL/PF 1 MG/ML
0.2 SYRINGE (ML) INJECTION
Status: DISCONTINUED | OUTPATIENT
Start: 2023-05-10 | End: 2023-05-10 | Stop reason: HOSPADM

## 2023-05-10 RX ORDER — DEXAMETHASONE SODIUM PHOSPHATE 10 MG/ML
INJECTION, SOLUTION INTRAMUSCULAR; INTRAVENOUS AS NEEDED
Status: DISCONTINUED | OUTPATIENT
Start: 2023-05-10 | End: 2023-05-10

## 2023-05-10 RX ORDER — CEFAZOLIN SODIUM 1 G/50ML
SOLUTION INTRAVENOUS AS NEEDED
Status: DISCONTINUED | OUTPATIENT
Start: 2023-05-10 | End: 2023-05-10

## 2023-05-10 RX ORDER — DEXAMETHASONE SODIUM PHOSPHATE 4 MG/ML
INJECTION, SOLUTION INTRA-ARTICULAR; INTRALESIONAL; INTRAMUSCULAR; INTRAVENOUS; SOFT TISSUE AS NEEDED
Status: DISCONTINUED | OUTPATIENT
Start: 2023-05-10 | End: 2023-05-10

## 2023-05-10 RX ORDER — FENTANYL CITRATE/PF 50 MCG/ML
25 SYRINGE (ML) INJECTION
Status: DISCONTINUED | OUTPATIENT
Start: 2023-05-10 | End: 2023-05-10 | Stop reason: HOSPADM

## 2023-05-10 RX ORDER — ROCURONIUM BROMIDE 10 MG/ML
INJECTION, SOLUTION INTRAVENOUS AS NEEDED
Status: DISCONTINUED | OUTPATIENT
Start: 2023-05-10 | End: 2023-05-10

## 2023-05-10 RX ORDER — SODIUM CHLORIDE, SODIUM LACTATE, POTASSIUM CHLORIDE, CALCIUM CHLORIDE 600; 310; 30; 20 MG/100ML; MG/100ML; MG/100ML; MG/100ML
75 INJECTION, SOLUTION INTRAVENOUS CONTINUOUS
Status: DISCONTINUED | OUTPATIENT
Start: 2023-05-10 | End: 2023-05-10 | Stop reason: HOSPADM

## 2023-05-10 RX ORDER — OXYCODONE HYDROCHLORIDE AND ACETAMINOPHEN 5; 325 MG/1; MG/1
1 TABLET ORAL ONCE AS NEEDED
Status: COMPLETED | OUTPATIENT
Start: 2023-05-10 | End: 2023-05-10

## 2023-05-10 RX ORDER — FENTANYL CITRATE 50 UG/ML
INJECTION, SOLUTION INTRAMUSCULAR; INTRAVENOUS AS NEEDED
Status: DISCONTINUED | OUTPATIENT
Start: 2023-05-10 | End: 2023-05-10

## 2023-05-10 RX ORDER — LIDOCAINE HYDROCHLORIDE 10 MG/ML
INJECTION, SOLUTION EPIDURAL; INFILTRATION; INTRACAUDAL; PERINEURAL AS NEEDED
Status: DISCONTINUED | OUTPATIENT
Start: 2023-05-10 | End: 2023-05-10

## 2023-05-10 RX ORDER — ONDANSETRON 2 MG/ML
INJECTION INTRAMUSCULAR; INTRAVENOUS AS NEEDED
Status: DISCONTINUED | OUTPATIENT
Start: 2023-05-10 | End: 2023-05-10

## 2023-05-10 RX ORDER — CEFAZOLIN SODIUM 1 G/50ML
1000 SOLUTION INTRAVENOUS ONCE
Status: DISCONTINUED | OUTPATIENT
Start: 2023-05-10 | End: 2023-05-10 | Stop reason: HOSPADM

## 2023-05-10 RX ADMIN — FENTANYL CITRATE 25 MCG: 50 INJECTION, SOLUTION INTRAMUSCULAR; INTRAVENOUS at 13:00

## 2023-05-10 RX ADMIN — CEFAZOLIN SODIUM 1000 MG: 1 SOLUTION INTRAVENOUS at 11:16

## 2023-05-10 RX ADMIN — DEXAMETHASONE SODIUM PHOSPHATE 5 MG: 10 INJECTION, SOLUTION INTRAMUSCULAR; INTRAVENOUS at 11:30

## 2023-05-10 RX ADMIN — OXYCODONE HYDROCHLORIDE AND ACETAMINOPHEN 1 TABLET: 5; 325 TABLET ORAL at 15:08

## 2023-05-10 RX ADMIN — LIDOCAINE HYDROCHLORIDE 50 MG: 10 INJECTION, SOLUTION EPIDURAL; INFILTRATION; INTRACAUDAL at 11:20

## 2023-05-10 RX ADMIN — FENTANYL CITRATE 50 MCG: 50 INJECTION, SOLUTION INTRAMUSCULAR; INTRAVENOUS at 11:20

## 2023-05-10 RX ADMIN — SUGAMMADEX 200 MG: 100 INJECTION, SOLUTION INTRAVENOUS at 12:29

## 2023-05-10 RX ADMIN — ROCURONIUM BROMIDE 10 MG: 10 INJECTION, SOLUTION INTRAVENOUS at 11:55

## 2023-05-10 RX ADMIN — SODIUM CHLORIDE, SODIUM LACTATE, POTASSIUM CHLORIDE, AND CALCIUM CHLORIDE 75 ML/HR: .6; .31; .03; .02 INJECTION, SOLUTION INTRAVENOUS at 09:28

## 2023-05-10 RX ADMIN — ROCURONIUM BROMIDE 50 MG: 10 INJECTION, SOLUTION INTRAVENOUS at 11:20

## 2023-05-10 RX ADMIN — MIDAZOLAM 2 MG: 1 INJECTION INTRAMUSCULAR; INTRAVENOUS at 11:12

## 2023-05-10 RX ADMIN — FENTANYL CITRATE 50 MCG: 50 INJECTION, SOLUTION INTRAMUSCULAR; INTRAVENOUS at 11:53

## 2023-05-10 RX ADMIN — SODIUM CHLORIDE, SODIUM LACTATE, POTASSIUM CHLORIDE, AND CALCIUM CHLORIDE: .6; .31; .03; .02 INJECTION, SOLUTION INTRAVENOUS at 12:26

## 2023-05-10 RX ADMIN — ONDANSETRON 4 MG: 2 INJECTION INTRAMUSCULAR; INTRAVENOUS at 11:30

## 2023-05-10 RX ADMIN — FENTANYL CITRATE 25 MCG: 50 INJECTION, SOLUTION INTRAMUSCULAR; INTRAVENOUS at 13:21

## 2023-05-10 RX ADMIN — PROPOFOL 150 MG: 10 INJECTION, EMULSION INTRAVENOUS at 11:20

## 2023-05-10 NOTE — ANESTHESIA POSTPROCEDURE EVALUATION
Post-Op Assessment Note    CV Status:  Stable  Pain Score: 0    Pain management: adequate  Multimodal analgesia used between 6 hours prior to anesthesia start to PACU discharge    Mental Status:  Sleepy and arousable   Hydration Status:  Stable   PONV Controlled:  None   Airway Patency:  Patent   Two or more mitigation strategies used for obstructive sleep apnea   Post Op Vitals Reviewed: Yes      Staff: CRNA         No notable events documented      BP   116/67   Temp 97   Pulse 75   Resp 16   SpO2 99

## 2023-05-10 NOTE — H&P
H&P reviewed  After examining the patient, I find no changed to the H&P since it had been written  Patient re-evaluated   Accept as history and physical     Vitals:    05/10/23 1330   BP: 115/62   Pulse: 88   Resp: 16   Temp: 98 °F (36 7 °C)   SpO2: 95%       Phong Carpenter MD/May 10, 2023/3:25 PM

## 2023-05-10 NOTE — PERIOPERATIVE NURSING NOTE
Patient received from PACU  Patient is in pain, medicated in pacu before transfer  Incision is clean and dry  Patient tolerating PO fluids  Family at bedside

## 2023-05-10 NOTE — ADDENDUM NOTE
Addendum  created 05/10/23 1501 by Jamison Rogers MD    Order list changed, Pharmacy for encounter modified

## 2023-05-10 NOTE — OP NOTE
OPERATIVE REPORT  PATIENT NAME: Behzad Tamayo    :  1955  MRN: [id number]  Pt Location: [facility] OR ROOM 04    SURGERY DATE: 5/10/2023    Surgeon(s) and Role:     * Suzette Jeffers MD - Primary     * Arsenio Lambert MD - Assisting    Preop Diagnosis:  Left thyroid nodule [E04 1]    Post-Op Diagnosis Codes:     * Left thyroid nodule [E04 1]    Procedure(s):  Left - LOBECTOMY THYROID    Specimen(s):  ID Type Source Tests Collected by Time Destination   1 : Left thyroid lobe Tissue Thyroid, Left TISSUE EXAM Suzette Jeffers MD 5/10/2023 1207        Estimated Blood Loss:   5 mL    Drains:  * No LDAs found *    Anesthesia Type:   General    Operative Indications:  Left thyroid nodule [E04 1]    Operative Findings:  Left thyroid nodule    Complications:   None    Procedure and Technique:  The patient was seen in the Holding Room  The risks, benefits, complications, treatment options, and expected outcomes were discussed with the patient  The patient and/or family concurred with the proposed plan, giving informed consent  The site of surgery properly noted/marked  The patient was taken to Operating Room and the procedure verified  Prior to the induction of general anesthesia, antibiotic prophylaxis was administered  General anesthesia was then administered and tolerated well  After the induction, the patient was prepped and draped in the usual sterile fashion  A Time Out was held  I was present for the entire procedure  A transverse incision was made along to the skin crease on anterior neck and this was carried down to through the platysma to the level of the strap muscles  Subplatysmal flaps were elevated and the strap muscles were split in the midline  Left thyroid nodule was identified  Strap muscles were dissected off from the left thyroid by electrocautery and Enseal energy device  The superior pole of the thyroid gland was identified and transected after suture ligated   The left thyroid was rotated medially and inferior pole was released  The remaining attachement of the thyroid were dissected from trachea by Harmonic energy device  The left recurrent nerve was identified during the process, which was intact  The thyroid isthmus was split over the trachea using Ensure electrocautery  The specimen was sent for pathology  Irrigation was performed with NS  Hemostasis was confirmed  The strap muscle was approximated in the midline with 3-0 vicryl running suture  Platysma was also approximated with 3-0 vicryl  The skin was closed with subcuticular running suture with 4-0 monocryl  The surgical glue was applied      Patient Disposition:  PACU         SIGNATURE: Angel Nguyen MD  DATE: May 10, 2023  TIME: 2:13 PM

## 2023-05-10 NOTE — PERIOPERATIVE NURSING NOTE
AVS reviewed with patient and  at bedside  AVS reviewed with daughter Iris Gomez via telephone  All questions and concerns addressed  Pain is under control  Patient feels comfortable enough to dress and be discharged

## 2023-05-10 NOTE — DISCHARGE INSTR - AVS FIRST PAGE
Keep incision clean and dry for 2 days  After 2 days, it may get wet in the shower  No dressing is required  2   Take ibuprofen, 600 mg, 3 times a day regularly  3   Take Percocet, in addition to ibuprofen, if you need further pain control  4   If you do not already have an appointment, call my office at [phone] for an appointment to be seen in about 10 to 14 days

## 2023-05-18 ENCOUNTER — OFFICE VISIT (OUTPATIENT)
Dept: SURGERY | Facility: CLINIC | Age: 68
End: 2023-05-18

## 2023-05-18 VITALS — TEMPERATURE: 96.3 F | WEIGHT: 135.2 LBS | BODY MASS INDEX: 21.22 KG/M2 | HEIGHT: 67 IN

## 2023-05-18 DIAGNOSIS — Z09 SURGICAL FOLLOW-UP CARE: ICD-10-CM

## 2023-05-18 DIAGNOSIS — C73 PAPILLARY MICROCARCINOMA OF THYROID (HCC): Primary | ICD-10-CM

## 2023-05-18 NOTE — PROGRESS NOTES
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  We discussed this diagnosis and her overall excellent prognosis  We discussed completion thyroidectomy, risks and complications, and benefits of decreasing risk of recurrence but no overall survival benefit  At this time, she would like to proceed with no further surgery  Plan:  1  We will order ultrasound mapping of the left neck  I reassured that this was just to complete the work-up  2   I will order endocrinology consult and have already discussed this with Dr Jordan Alcaraz  3   She has a small nodule at the end of her incision which is likely secondary to the knot of Monocryl suture  I offered to revise this in the clinic should she desire  Chief Complaint:  \"I am here for follow-up\"    HPI  Patient is a 64-year-old woman status post left thyroid lobectomy 10 May 2023 is here for follow-up  She has a question regarding a small nodule at the end of her incision and also to review pathology report  She reports minimal pain and no wound issues  She has a normal voice  Pathology report:  Final Diagnosis   A  Thyroid and parathyroid tissue, \"left thyroid lobe\"; lobectomy:      - Papillary thyroid carcinoma, classic, 0 8 cm, see note       - Angioinvasion: Not identified       - Lymphatic invasion: Not identified       - Extrathyroidal extension: Not identified       - All margins negative for carcinoma       - Background thyroid with follicular nodular disease and previous biopsy site changes      - One fragment of normocellular parathyroid tissue      - See synoptic report         PMH:  No past medical history on file      PSH:  Past Surgical History:   Procedure Laterality Date   • COLONOSCOPY     • HEMORRHOID SURGERY      1995   • HYSTERECTOMY      supracervical / 1998   • INCISIONAL BREAST BIOPSY      x2 1996   • [place] PRTL THYROID LOBECTOMY UNI W/WO " ISTHMUSECTOMY Left 5/10/2023    Procedure: LOBECTOMY THYROID;  Surgeon: Home Lindsey MD;  Location: [facility];  Service: General   • SHOULDER SURGERY      bone spur 1999   • Ben 634 THYROID BIOPSY  04/18/2023       Home Meds:  Current Outpatient Medications on File Prior to Visit   Medication Sig Dispense Refill   • Multiple Vitamins-Minerals (CENTRUM SILVER 50+WOMEN PO) Take by mouth     • oxyCODONE-acetaminophen (Percocet) 5-325 mg per tablet Take 1 tablet by mouth every 6 (six) hours as needed for moderate pain for up to 6 doses Max Daily Amount: 4 tablets 6 tablet 0     No current facility-administered medications on file prior to visit         Allergies:  No Known Allergies    Social Hx:  Social History     Socioeconomic History   • Marital status: /Civil Union     Spouse name: Not on file   • Number of children: Not on file   • Years of education: Not on file   • Highest education level: Not on file   Occupational History   • Not on file   Tobacco Use   • Smoking status: Never   • Smokeless tobacco: Never   Vaping Use   • Vaping Use: Never used   Substance and Sexual Activity   • Alcohol use: Yes     Comment: occasional alcohol use    • Drug use: No   • Sexual activity: Not Currently     Partners: Male     Birth control/protection: Post-menopausal     Comment: declines STD / HIV testing    Other Topics Concern   • Not on file   Social History Narrative    Feels safe at home     Social Determinants of Health     Financial Resource Strain: Not on file   Food Insecurity: Not on file   Transportation Needs: Not on file   Physical Activity: Not on file   Stress: Not on file   Social Connections: Not on file   Intimate Partner Violence: Not on file   Housing Stability: Not on file        Family Hx:    Family History   Problem Relation Age of Onset   • Uterine cancer Mother    • Cancer Mother    • Heart disease Father         cardiac disorder    • Diabetes Father    • Other Sister         hip arthrosis "  • Uterine cancer Maternal Aunt    • Uterine cancer Maternal Aunt    • No Known Problems Paternal Aunt    • No Known Problems Paternal Aunt    • No Known Problems Paternal Aunt    • No Known Problems Paternal Aunt    • Cancer Paternal Aunt          Review of Systems   Constitutional: Negative for chills and fever  Respiratory: Negative  Cardiovascular: Negative  Gastrointestinal: Negative  Genitourinary: Negative  Musculoskeletal: Negative  Neurological: Negative  Hematological: Negative  All other systems reviewed and are negative  Temp (!) 96 3 °F (35 7 °C) (Core)   Ht 5' 7\" (1 702 m)   Wt 61 3 kg (135 lb 3 2 oz)   BMI 21 18 kg/m²     Physical Exam  Vitals reviewed  Constitutional:       General: She is not in acute distress  Appearance: Normal appearance  Neck:      Comments: Incision is healing nicely  On the right the lateral aspect, there is some nodularity which could be a reaction to the knot of Monocryl  Cardiovascular:      Rate and Rhythm: Normal rate  Pulmonary:      Effort: Pulmonary effort is normal    Abdominal:      General: Abdomen is flat  There is no distension  Palpations: Abdomen is soft  Skin:     General: Skin is warm and dry  Neurological:      General: No focal deficit present  Mental Status: She is alert           Pertinent labs reviewed    Pertinent images and available reads personally reviewed    Pertinent notes reviewed       Humberto Gisbon MD [address] [facility]  [phone]        "

## 2023-05-19 ENCOUNTER — TELEPHONE (OUTPATIENT)
Dept: SURGERY | Facility: CLINIC | Age: 68
End: 2023-05-19

## 2023-05-19 ENCOUNTER — TELEPHONE (OUTPATIENT)
Dept: ENDOCRINOLOGY | Facility: CLINIC | Age: 68
End: 2023-05-19

## 2023-05-19 NOTE — TELEPHONE ENCOUNTER
"----- Message from Greg Fuentes MD sent at 5/19/2023  9:35 AM EDT -----  Regarding: FW: Incision site  Contact: [phone]  Ultrasound mapping should be of the left neck  ----- Message -----  From: Michele Figueroa MA  Sent: 5/19/2023   7:43 AM EDT  To: Greg Fuentes MD  Subject: FW: Incision site                                Good morning Dr Payal Scott,    Let me know if mapping should be left side, and I will call patient  Chiquita Padgett  ----- Message -----  From: [facility]  Sent: 5/18/2023   8:14 PM EDT  To: [facility]  Subject: Incision site                                    Mario Chu and Dr Payal Scott,     I was looking at the script for the ultra sound  It says \"Lymph node mapping left neck    I had the surgery on the left side  Shouldn't this say \"right\" side? Thanks!   Nena Arellano       "

## 2023-05-22 ENCOUNTER — CONSULT (OUTPATIENT)
Dept: ENDOCRINOLOGY | Facility: CLINIC | Age: 68
End: 2023-05-22

## 2023-05-22 VITALS
HEART RATE: 84 BPM | WEIGHT: 135 LBS | DIASTOLIC BLOOD PRESSURE: 60 MMHG | SYSTOLIC BLOOD PRESSURE: 110 MMHG | BODY MASS INDEX: 21.19 KG/M2 | HEIGHT: 67 IN

## 2023-05-22 DIAGNOSIS — E55.9 VITAMIN D DEFICIENCY: ICD-10-CM

## 2023-05-22 DIAGNOSIS — Z90.09 HISTORY OF LOBECTOMY OF THYROID: Primary | ICD-10-CM

## 2023-05-22 DIAGNOSIS — C73 PAPILLARY MICROCARCINOMA OF THYROID (HCC): ICD-10-CM

## 2023-05-22 RX ORDER — LEVOTHYROXINE SODIUM 0.03 MG/1
25 TABLET ORAL DAILY
Qty: 90 TABLET | Refills: 3 | Status: SHIPPED | OUTPATIENT
Start: 2023-05-22 | End: 2023-05-25 | Stop reason: SDUPTHER

## 2023-05-22 NOTE — PATIENT INSTRUCTIONS
Start levothyroxine 25 mcg orally daily on an empty stomach  Check labs in 6 to 8 weeks  Ensure calcium intake of 6907-3543 mg orally daily through diet/supplementation    Instructions on taking levothyroxine (thyroid medication)     Please take the medication on an empty stomach, at least 30 min before breakfast  If you are taking it at night, please take it a minimum of 4 hr after your last meal    Separate your thyroid medication form any calcium or iron products by at least 4 hours  Certain medications can interfere with the absorption and metabolism of thyroid hormone, this can alter your thyroid levels, make sure you check with your pharmacy about any drug interactions,  before starting any new medications  If you miss a dose of thyroid hormone therapy, take it immediately upon noticing or take 2 pills the next day to make up the missed dose  A Guide to Calcium-Rich Foods  We all know that milk is a great source of calcium, but you may be surprised by all the different foods you can work into your diet to reach your daily recommended amount of calcium   Use the guide below to get ideas of additional calcium-rich foods to add to your weekly shopping list   Produce Serving Size Estimated Calcium*   [place] greens, frozen 8 oz 360 mg   Broccoli messi 8 oz 200 mg   Kale, frozen 8 oz 180 mg   Soy Beans, green, boiled 8 oz 175 mg   Bok [place], cooked, boiled 8 oz 160 mg   Figs, dried 2 figs 65 mg   Broccoli, fresh, cooked 8 oz 60 mg   Oranges 1 whole 55 mg   Seafood Serving Size Estimated Calcium*   Sardines, canned with bones 3 oz 325 mg   [place], canned with bones 3 oz 180 mg   Shrimp, canned 3 oz 125 mg   Dairy Serving Size Estimated Calcium*   Ricotta, part-skim 4 oz 335 mg   Yogurt, plain, low-fat 6 oz 310 mg   Milk, skim, low-fat, whole 8 oz 300 mg   Yogurt with fruit, low-fat 6 oz 260 mg   Mozzarella, part-skim 1 oz 210 mg   Cheddar 1 oz 205 mg   Yogurt, Greek 6 oz 200 mg   American Cheese 1 oz 195 mg   Feta Cheese 4 oz 140 mg   Cottage Cheese, 2% 4 oz 105 mg   Frozen yogurt, vanilla 8 oz 105 mg   Ice Cream, vanilla 8 oz 85 mg   Parmesan 1 tbsp 55 mg   Fortified Food Serving Size Estimated Calcium*   [place] milk, rice milk or soy milk, fortified 8 oz 300 mg   Orange juice and other fruit juices, fortified 8 oz 300 mg   Tofu, prepared with calcium 4 oz 205 mg   Waffle, frozen, fortified 2 pieces 200 mg   Oatmeal, fortified 1 packet 140 mg   English muffin, fortified 1 muffin 100 mg   Cereal, fortified 8 oz 100-1,000 mg   Other Serving Size Estimated Calcium*   Mac & cheese, frozen 1 package 325 mg   Pizza, cheese, frozen 1 serving 115 mg   Pudding, chocolate, prepared with 2% milk 4 oz 160 mg   Beans, baked, canned 4 oz 160 mg   *The calcium content listed for most foods is estimated and can vary due to multiple factors  Check the food label to determine how much calcium is in a particular product

## 2023-05-22 NOTE — PROGRESS NOTES
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 MRN: [id number]    Encounter: [id number]      Assessment/Plan     1  Papillary thyroid carcinoma, microcarcinoma 0 8 cm, classic  2  Status post left hemithyroidectomy  NATY low risk  Start levothyroxine 25 mcg orally daily on an empty stomach  Check TSH, free T4, TG, TG antibody in 6 to 8 weeks  Ultrasound lymph node mapping    Discussed diagnosis of thyroid cancer, management including TSH suppression, monitoring thyroglobulin levels, recurrence neck ultrasound  Discussed lobectomy versus completion thyroidectomy, radioactive iodine if patient were to have total thyroidectomy  Since the final pathology is papillary thyroid carcinoma, 0 8 cm, lobectomy would be reasonable and patient does not necessarily need completion thyroidectomy at this time  3  hoarseness in voice-since vocal cord assessment was not done prior to surgery, would recommend following up with ENT once surgical site has completely healed  4 history of vitamin D deficiency-check vitamin D level  5  Screening for osteoporosis-already scheduled for DEXA scan Ensure adequate calcium intake 1-1200 mg orally daily through diet/supplementation    Follow up 3 months       CC: Thyroid carcinoma    History of Present Illness     HPI:  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 is here for a new consult for follow-up of thyroid carcinoma    Initially noticed a lump in the neck approximately 6 months ago  Ultrasound thyroid 3/2023, confirmed 2 8 X1 7X 2 2 cm left thyroid nodule, TR 3  FNA 4/2023-follicular neoplasm/suspicious for follicular neoplasm;  Afirma positive    Patient is status post left hemithyroidectomy on 5/10/23  Final pathology-Thyroid and parathyroid tissue, \"left thyroid lobe\"; lobectomy:      - Papillary thyroid carcinoma, classic, 0 8 cm      - Angioinvasion: Not identified       - Lymphatic invasion: Not identified       - Extrathyroidal extension: Not identified       - All margins negative for carcinoma    " - Background thyroid with follicular nodular disease and previous biopsy site changes      - One fragment of normocellular parathyroid tissue    Energy levels are good   Weight, lost 5-6 lbs in the last 1-2 months   Feels both hot and cold  Had some constipation postsurgery, now reseolved   Does not have any other symptoms of hyper or hypothyroidism    States that her children noticed that her voice has been hoarse for a long time  Waxes and wanes  Not worsened since surgery  Denies difficulty in swallowing/breathing    Daughter with history of hypothyroidism  No family history of thyroid cancer  No history of radiation to the head or neck region    Not on any Vitamin D supplementation  Diet-consumes milk, yogurt, cheese regularly  DXA scheduled for 6/2023   No h/o fractures     All other systems were reviewed and are negative  Review of Systems    Historical Information   History reviewed  No pertinent past medical history    Past Surgical History:   Procedure Laterality Date   • COLONOSCOPY     • HEMORRHOID SURGERY      1995   • HYSTERECTOMY      supracervical / 1998   • INCISIONAL BREAST BIOPSY      x2 1996   • [place] PRTL THYROID LOBECTOMY UNI W/WO ISTHMUSECTOMY Left 5/10/2023    Procedure: LOBECTOMY THYROID;  Surgeon: Oneal Lambert MD;  Location: [address] [place] [address];  Service: General   • SHOULDER SURGERY      bone spur 1999   • Ben 634 THYROID BIOPSY  04/18/2023     Social History   Social History     Substance and Sexual Activity   Alcohol Use Yes    Comment: occasional alcohol use      Social History     Substance and Sexual Activity   Drug Use No     Social History     Tobacco Use   Smoking Status Never   Smokeless Tobacco Never     Family History:   Family History   Problem Relation Age of Onset   • Uterine cancer Mother    • Cancer Mother    • Heart disease Father         cardiac disorder    • Diabetes Father    • Other Sister         hip arthrosis    • Uterine cancer Maternal Aunt    • Uterine cancer "Maternal Aunt    • No Known Problems Paternal Aunt    • No Known Problems Paternal Aunt    • No Known Problems Paternal Aunt    • No Known Problems Paternal Aunt    • Cancer Paternal Aunt        Meds/Allergies   Current Outpatient Medications   Medication Sig Dispense Refill   • levothyroxine (Levoxyl) 25 mcg tablet Take 1 tablet (25 mcg total) by mouth daily 90 tablet 3   • Multiple Vitamins-Minerals (CENTRUM SILVER 50+WOMEN PO) Take by mouth     • oxyCODONE-acetaminophen (Percocet) 5-325 mg per tablet Take 1 tablet by mouth every 6 (six) hours as needed for moderate pain for up to 6 doses Max Daily Amount: 4 tablets (Patient not taking: Reported on 5/18/2023) 6 tablet 0     No current facility-administered medications for this visit  No Known Allergies    Objective   Vitals: Blood pressure 110/60, pulse 84, height 5' 7\" (1 702 m), weight 61 2 kg (135 lb)  Physical Exam  Constitutional:       Appearance: She is well-developed  HENT:      Head: Normocephalic and atraumatic  Eyes:      Conjunctiva/sclera: Conjunctivae normal       Pupils: Pupils are equal, round, and reactive to light  Neck:      Thyroid: No thyromegaly  Comments: Well healing anterior incision   Cardiovascular:      Rate and Rhythm: Normal rate and regular rhythm  Heart sounds: Normal heart sounds  No murmur heard  Pulmonary:      Effort: Pulmonary effort is normal       Breath sounds: Normal breath sounds  No wheezing  Abdominal:      General: There is no distension  Palpations: Abdomen is soft  Tenderness: There is no abdominal tenderness  Musculoskeletal:         General: Normal range of motion  Cervical back: Normal range of motion and neck supple  Lymphadenopathy:      Cervical: No cervical adenopathy  Skin:     General: Skin is warm and dry  Neurological:      Mental Status: She is alert and oriented to person, place, and time        Deep Tendon Reflexes: Reflexes normal       Comments: No " "tremors on the outstretched arms      Psychiatric:         Behavior: Behavior normal          The history was obtained from the review of the chart, patient  Lab Results:   Lab Results   Component Value Date/Time    TSH 3RD GENERATON 2 952 03/08/2023 09:44 AM     Lab Results   Component Value Date    WBC 7 23 03/08/2023    HGB 13 6 03/08/2023    HCT 41 9 03/08/2023    MCV 90 03/08/2023     03/08/2023     Lab Results   Component Value Date    CREATININE 0 55 (L) 03/08/2023    BUN 18 03/08/2023    K 4 8 03/08/2023     03/08/2023    CO2 29 03/08/2023     Lab Results   Component Value Date    HGBA1C 5 9 (H) 08/25/2020         Imaging Studies:   Results for orders placed during the hospital encounter of 03/20/23    US thyroid    Impression  Recommend tissue sampling of the left lower pole nodule described above  The study was marked in EPIC for significant notification         Reference: ACR Thyroid Imaging, Reporting and Data System (TI-RADS): White Paper of the [employer]  J AM Anibal Radiol 2204;75:971-870  (additional recommendations based on American Thyroid Association 2015 guidelines )      Workstation performed: [id number]      I have personally reviewed pertinent reports  Portions of the record may have been created with voice recognition software  Occasional wrong word or \"sound a like\" substitutions may have occurred due to the inherent limitations of voice recognition software  Read the chart carefully and recognize, using context, where substitutions have occurred       "

## 2023-05-24 DIAGNOSIS — Z90.09 HISTORY OF LOBECTOMY OF THYROID: ICD-10-CM

## 2023-05-24 DIAGNOSIS — E55.9 VITAMIN D DEFICIENCY: ICD-10-CM

## 2023-05-24 DIAGNOSIS — C73 PAPILLARY MICROCARCINOMA OF THYROID (HCC): ICD-10-CM

## 2023-05-24 NOTE — TELEPHONE ENCOUNTER
Patient would like for her   Levothyroxine to go to  [facility] it was denied through  Her mail order

## 2023-05-25 RX ORDER — LEVOTHYROXINE SODIUM 0.03 MG/1
25 TABLET ORAL DAILY
Qty: 90 TABLET | Refills: 0 | Status: SHIPPED | OUTPATIENT
Start: 2023-05-25 | End: 2023-08-23

## 2023-06-19 ENCOUNTER — HOSPITAL ENCOUNTER (OUTPATIENT)
Dept: RADIOLOGY | Facility: HOSPITAL | Age: 68
Discharge: HOME | End: 2023-06-19
Payer: MEDICARE

## 2023-06-19 ENCOUNTER — HOSPITAL ENCOUNTER (OUTPATIENT)
Dept: RADIOLOGY | Facility: HOSPITAL | Age: 68
Discharge: HOME | End: 2023-06-19
Attending: FAMILY MEDICINE
Payer: MEDICARE

## 2023-06-19 VITALS — HEIGHT: 67 IN | BODY MASS INDEX: 21.66 KG/M2 | WEIGHT: 138 LBS

## 2023-06-19 DIAGNOSIS — M81.0 POSTMENOPAUSAL BONE LOSS: ICD-10-CM

## 2023-06-19 DIAGNOSIS — Z12.31 ENCOUNTER FOR SCREENING MAMMOGRAM FOR MALIGNANT NEOPLASM OF BREAST: ICD-10-CM

## 2023-06-19 PROCEDURE — 77063 BREAST TOMOSYNTHESIS BI: CPT

## 2023-06-19 PROCEDURE — 77080 DXA BONE DENSITY AXIAL: CPT

## 2023-06-19 PROCEDURE — 77067 SCR MAMMO BI INCL CAD: CPT

## 2023-06-21 ENCOUNTER — TELEPHONE (OUTPATIENT)
Dept: FAMILY MEDICINE CLINIC | Facility: CLINIC | Age: 68
End: 2023-06-21

## 2023-06-21 NOTE — TELEPHONE ENCOUNTER
I called left message for mallika  dexa normal bone density cont ca and vit d f/u 2 yrs call with questions

## 2023-07-03 ENCOUNTER — HOSPITAL ENCOUNTER (OUTPATIENT)
Dept: RADIOLOGY | Facility: HOSPITAL | Age: 68
Discharge: HOME | End: 2023-07-03
Attending: SURGERY
Payer: MEDICARE

## 2023-07-03 DIAGNOSIS — C73 PAPILLARY MICROCARCINOMA OF THYROID (HCC): ICD-10-CM

## 2023-07-03 PROCEDURE — 76536 US EXAM OF HEAD AND NECK: CPT

## 2023-08-15 ENCOUNTER — APPOINTMENT (OUTPATIENT)
Dept: LAB | Facility: HOSPITAL | Age: 68
End: 2023-08-15
Attending: INTERNAL MEDICINE
Payer: MEDICARE

## 2023-08-15 DIAGNOSIS — C73 PAPILLARY MICROCARCINOMA OF THYROID (HCC): ICD-10-CM

## 2023-08-15 DIAGNOSIS — E55.9 VITAMIN D DEFICIENCY: ICD-10-CM

## 2023-08-15 DIAGNOSIS — Z90.09 HISTORY OF LOBECTOMY OF THYROID: ICD-10-CM

## 2023-08-15 LAB
25(OH)D3 SERPL-MCNC: 26 NG/ML (ref 30–100)
T4 FREE SERPL-MCNC: 0.74 NG/DL (ref 0.61–1.12)
TSH SERPL DL<=0.05 MIU/L-ACNC: 4.5 UIU/ML (ref 0.45–4.5)

## 2023-08-15 PROCEDURE — 84439 ASSAY OF FREE THYROXINE: CPT

## 2023-08-15 PROCEDURE — 82306 VITAMIN D 25 HYDROXY: CPT

## 2023-08-15 PROCEDURE — 84432 ASSAY OF THYROGLOBULIN: CPT

## 2023-08-15 PROCEDURE — 84443 ASSAY THYROID STIM HORMONE: CPT

## 2023-08-15 PROCEDURE — 86800 THYROGLOBULIN ANTIBODY: CPT

## 2023-08-15 PROCEDURE — 36415 COLL VENOUS BLD VENIPUNCTURE: CPT

## 2023-08-18 LAB
THYROGLOB AB SERPL-ACNC: <1 IU/ML (ref 0–0.9)
THYROGLOB SERPL-MCNC: 25.2 NG/ML (ref 1.5–38.5)

## 2023-08-21 ENCOUNTER — OFFICE VISIT (OUTPATIENT)
Dept: ENDOCRINOLOGY | Facility: CLINIC | Age: 68
End: 2023-08-21
Payer: MEDICARE

## 2023-08-21 VITALS
SYSTOLIC BLOOD PRESSURE: 100 MMHG | WEIGHT: 134.2 LBS | DIASTOLIC BLOOD PRESSURE: 70 MMHG | HEIGHT: 67 IN | HEART RATE: 72 BPM | BODY MASS INDEX: 21.06 KG/M2

## 2023-08-21 DIAGNOSIS — E55.9 VITAMIN D DEFICIENCY: ICD-10-CM

## 2023-08-21 DIAGNOSIS — C73 PAPILLARY MICROCARCINOMA OF THYROID (HCC): ICD-10-CM

## 2023-08-21 DIAGNOSIS — Z90.09 HISTORY OF LOBECTOMY OF THYROID: ICD-10-CM

## 2023-08-21 PROBLEM — E04.1 LEFT THYROID NODULE: Status: RESOLVED | Noted: 2023-05-10 | Resolved: 2023-08-21

## 2023-08-21 PROCEDURE — 99214 OFFICE O/P EST MOD 30 MIN: CPT | Performed by: NURSE PRACTITIONER

## 2023-08-21 RX ORDER — LEVOTHYROXINE SODIUM 0.03 MG/1
37.5 TABLET ORAL DAILY
Qty: 135 TABLET | Refills: 1 | Status: SHIPPED | OUTPATIENT
Start: 2023-08-21 | End: 2023-11-19

## 2023-08-21 RX ORDER — LEVOTHYROXINE SODIUM 0.03 MG/1
37.5 TABLET ORAL DAILY
Qty: 135 TABLET | Refills: 1 | Status: SHIPPED | OUTPATIENT
Start: 2023-08-21 | End: 2023-08-21 | Stop reason: SDUPTHER

## 2023-08-21 NOTE — PROGRESS NOTES
Established Patient Progress Note    CC: Papillary Thyroid Cancer     Impression & Plan:    Problem List Items Addressed This Visit        Other    Papillary microcarcinoma of thyroid ([address])     History of left-sided hemithyroidectomy in May 203 for papillary microcarcinoma 0.8 cm, classic, identified as NATY low risk. At this time, patient would prefer TSH suppression, no medical contraindication, and monitoring with imaging. This is reasonable at this time. She was started on levothyroxine 25 mcg daily in May 2023. Component      Latest Ref Rng 8/15/2023   Free T4      0.61 - 1.12 ng/dL 0.74    TSH 3RD GENERATON      0.450 - 4.500 uIU/mL 4.504 (H)      Will increase levothyroxine to 37.5 mcg daily and recheck thyroid function tests in six weeks. Component      Latest Ref Rng 8/15/2023   THYROGLOBULIN AB      0.0 - 0.9 IU/mL <1.0    Thyroglobulin-TRISTIN      1.5 - 38.5 ng/mL 25.2      Ultrasound head, neck, lymph nodes reassuring. Will repeat in six months. Follow up in 3 months           Relevant Medications    levothyroxine (Levoxyl) 25 mcg tablet    Other Relevant Orders    TSH, 3rd generation    T4, free   Other Visit Diagnoses     History of lobectomy of thyroid        Relevant Medications    levothyroxine (Levoxyl) 25 mcg tablet    Vitamin D deficiency        Relevant Medications    levothyroxine (Levoxyl) 25 mcg tablet          Orders Placed This Encounter   Procedures   • TSH, 3rd generation     This is a patient instruction: This test is non-fasting. Please drink two glasses of water morning of bloodwork. Standing Status:   Future     Standing Expiration Date:   2/21/2024   • T4, free     Standing Status:   Future     Standing Expiration Date:   8/21/2024       History of Present Illness:     Dalila Altamirano is a 78 y.o. female with a history of papillary thyroid cancer s/p left hemithyroidectomy May 10, 2023. Seen first in consultation with our office May 22, 2023, seen by Dr. Corrie Aviles. Has history of papillary thyroid microcarcinoma 0.8 cm, classic idenitifed as NATY low risk. Lump in her neck was identified in November 2022 with ultrasound of her thyroid in March 2023 with confirmed 2.8 x 1.7 x 2.2  Cm left thyroid nodule, TR 3. FNA from April 2023 demonstrated follicular neoplasm/suspicious for follicular neoplasm; AFIRMA positive. She had left hemithyroidectomy in May 2023 and was started on levothyroxine 25 mcg daily. Had hoarseness in her voice that family noticed previous to surgery but did not worsen following, vocal cord assessment not completed prior to surgery, recommendation was to follow up with ENT once surgical site healed. Hypothyroidism: Taking levothyroxine 25 mcg daily, regularly and properly. Denies change in energy level or weight. Denies anxiety, jitteriness, or tremors. Denies tachycardia or palpitations. Denies constipation or hyperdefecation. Denies frequent headaches. Denies temperature intolerances. Has vitamin D deficiency, not taking any supplements. Osteoporosis screen DXA scan from June 2023 demonstrated normal BMD The 10 year risk of hip fracture is 0.4% with the 10 year risk of major osteoporotic fracture being 7.0% as calculated by the [facility] fracture risk assessment tool. Patient Active Problem List   Diagnosis   • Menopausal vaginal dryness   • Trigger middle finger of left hand   • Left-sided low back pain with left-sided sciatica   • Arthralgia of cervical spine   • Cervical myofascial pain syndrome   • Discogenic cervical pain   • Women's annual routine gynecological examination   • History of hysterectomy   • Papillary microcarcinoma of thyroid ([address])      History reviewed. No pertinent past medical history.    Past Surgical History:   Procedure Laterality Date   • BREAST CYST EXCISION Bilateral     benign   • COLONOSCOPY     • HEMORRHOID SURGERY      1995   • HYSTERECTOMY      supracervical / 1998   • INCISIONAL BREAST BIOPSY      x2 1996   • [place] PRTL THYROID LOBECTOMY UNI W/WO ISTHMUSECTOMY Left 05/10/2023    Procedure: LOBECTOMY THYROID;  Surgeon: Caitlin Crum MD;  Location: [facility];  Service: General   • SHOULDER SURGERY      bone spur 1999   • [address] THYROID BIOPSY  04/18/2023      Family History   Problem Relation Age of Onset   • Uterine cancer Mother    • Cancer Mother    • Heart disease Father         cardiac disorder    • Diabetes Father    • Diabetes unspecified Father    • Other Sister         hip arthrosis    • Uterine cancer Maternal Aunt    • Uterine cancer Maternal Aunt    • No Known Problems Paternal Aunt    • No Known Problems Paternal Aunt    • No Known Problems Paternal Aunt    • No Known Problems Paternal Aunt    • Cancer Paternal Aunt      Social History     Tobacco Use   • Smoking status: Never     Passive exposure: Past   • Smokeless tobacco: Never   Substance Use Topics   • Alcohol use: Not Currently     Alcohol/week: 1.0 standard drink of alcohol     Types: 1 Glasses of wine per week     Comment: occasional alcohol use      No Known Allergies    Current Outpatient Medications:   •  levothyroxine (Levoxyl) 25 mcg tablet, Take 1.5 tablets (37.5 mcg total) by mouth daily, Disp: 135 tablet, Rfl: 1  •  Multiple Vitamins-Minerals (CENTRUM SILVER 50+WOMEN PO), Take by mouth, Disp: , Rfl:     Review of Systems    Constitutional: Negative for activity change, appetite change, fatigue and unexpected weight change. HENT: Negative for trouble swallowing and voice change preceded surgery, not worsened. Eyes: Negative for visual disturbance. Respiratory: Negative for cough and shortness of breath. Cardiovascular: Negative for chest pain and palpitations. Gastrointestinal: Negative for abdominal distention, abdominal pain, constipation, diarrhea and vomiting. Endocrine: Negative for cold intolerance, heat intolerance, polydipsia and polyuria.    Genitourinary: Negative for dysuria and hematuria. Musculoskeletal: Negative for arthralgias and back pain. Skin: Negative for color change and rash. Neurological: Negative for seizures and syncope. All other systems reviewed and are negative. Physical Exam:  Body mass index is 21.02 kg/m². /70   Pulse 72   Ht 5' 7" (1.702 m)   Wt 60.9 kg (134 lb 3.2 oz)   BMI 21.02 kg/m²    Wt Readings from Last 3 Encounters:   08/21/23 60.9 kg (134 lb 3.2 oz)   06/19/23 62.6 kg (138 lb)   05/22/23 61.2 kg (135 lb)          Physical Exam  Vitals reviewed. Constitutional:       Appearance: Normal appearance. Neck: surgical site c/d/i  Cardiovascular:      Rate and Rhythm: Normal rate and regular rhythm. Pulses: Normal pulses. Heart sounds: Normal heart sounds. Pulmonary:      Effort: Pulmonary effort is normal.      Breath sounds: Normal breath sounds. Skin:     General: Skin is warm and dry. Capillary Refill: Capillary refill takes less than 2 seconds. Neurological:      General: No focal deficit present. Mental Status: She is alert and oriented to person, place, and time.    Psychiatric:         Mood and Affect: Mood normal.         Behavior: Behavior normal.       Labs:     Lab Results   Component Value Date    CREATININE 0.55 (L) 03/08/2023    CREATININE 0.82 03/19/2019    BUN 18 03/08/2023    K 4.8 03/08/2023     03/08/2023    CO2 29 03/08/2023     eGFR   Date Value Ref Range Status   03/08/2023 98 ml/min/1.73sq m Final       Lab Results   Component Value Date    HDL 63 03/08/2023    TRIG 104 03/08/2023    CHOLHDL 2.7 03/19/2019       Lab Results   Component Value Date    ALT 16 03/08/2023    AST 18 03/08/2023    ALKPHOS 88 03/08/2023       Lab Results   Component Value Date    FREET4 0.74 08/15/2023     Final pathology-Thyroid and parathyroid tissue, "left thyroid lobe"; lobectomy:      - Papillary thyroid carcinoma, classic, 0.8 cm      - Angioinvasion: Not identified       - Lymphatic invasion: Not identified       - Extrathyroidal extension: Not identified       - All margins negative for carcinoma       - Background thyroid with follicular nodular disease and previous biopsy site changes      - One fragment of normocellular parathyroid tissue      Patient Instructions   Recommend over the counter vitamin D3 supplement 1000 IU daily   Recommend continuing at least 3-4 servings of dairy per day (or calcium containing dietary choices) goal 7230-2606 mg of dietary calcium daily  Increase levothyroxine to 37.5 mcg daily and recheck thyroid function tests in six weeks. Discussed with the patient and all questioned fully answered. She will call me if any problems arise. Follow-up appointment in 3 months.      Counseled patient on diagnostic results, prognosis, risk and benefit of treatment options, instruction for management, importance of treatment compliance, Risk  factor reduction and impressions    MALVIN Buck

## 2023-08-21 NOTE — PATIENT INSTRUCTIONS
Recommend over the counter vitamin D3 supplement 1000 IU daily   Recommend continuing at least 3-4 servings of dairy per day (or calcium containing dietary choices) goal 9051-5010 mg of dietary calcium daily  Increase levothyroxine to 37.5 mcg daily and recheck thyroid function tests in six weeks.

## 2023-08-21 NOTE — ASSESSMENT & PLAN NOTE
History of left-sided hemithyroidectomy in May 203 for papillary microcarcinoma 0.8 cm, classic, identified as NATY low risk. At this time, patient would prefer TSH suppression, no medical contraindication, and monitoring with imaging. This is reasonable at this time. She was started on levothyroxine 25 mcg daily in May 2023. Component      Latest Ref Rng 8/15/2023   Free T4      0.61 - 1.12 ng/dL 0.74    TSH 3RD GENERATON      0.450 - 4.500 uIU/mL 4.504 (H)      Will increase levothyroxine to 37.5 mcg daily and recheck thyroid function tests in six weeks. Component      Latest Ref Rng 8/15/2023   THYROGLOBULIN AB      0.0 - 0.9 IU/mL <1.0    Thyroglobulin-TRISTIN      1.5 - 38.5 ng/mL 25.2      Ultrasound head, neck, lymph nodes reassuring. Will repeat in six months.      Follow up in 3 months

## 2023-10-11 ENCOUNTER — APPOINTMENT (OUTPATIENT)
Dept: LAB | Facility: HOSPITAL | Age: 68
End: 2023-10-11
Payer: MEDICARE

## 2023-10-11 DIAGNOSIS — C73 PAPILLARY MICROCARCINOMA OF THYROID (HCC): ICD-10-CM

## 2023-10-11 LAB
T4 FREE SERPL-MCNC: 0.86 NG/DL (ref 0.61–1.12)
TSH SERPL DL<=0.05 MIU/L-ACNC: 2.54 UIU/ML (ref 0.45–4.5)

## 2023-10-11 PROCEDURE — 84443 ASSAY THYROID STIM HORMONE: CPT

## 2023-10-11 PROCEDURE — 36415 COLL VENOUS BLD VENIPUNCTURE: CPT

## 2023-10-11 PROCEDURE — 84439 ASSAY OF FREE THYROXINE: CPT

## 2023-10-12 DIAGNOSIS — E55.9 VITAMIN D DEFICIENCY: ICD-10-CM

## 2023-10-12 DIAGNOSIS — C73 PAPILLARY MICROCARCINOMA OF THYROID (HCC): ICD-10-CM

## 2023-10-12 DIAGNOSIS — Z90.09 HISTORY OF LOBECTOMY OF THYROID: ICD-10-CM

## 2023-10-12 DIAGNOSIS — E89.0 POSTOPERATIVE HYPOTHYROIDISM: Primary | ICD-10-CM

## 2023-10-12 RX ORDER — LEVOTHYROXINE SODIUM 0.03 MG/1
37.5 TABLET ORAL DAILY
Qty: 45 TABLET | Refills: 2 | Status: SHIPPED | OUTPATIENT
Start: 2023-10-12 | End: 2024-01-10

## 2023-11-17 DIAGNOSIS — C73 PAPILLARY MICROCARCINOMA OF THYROID (HCC): ICD-10-CM

## 2023-11-17 DIAGNOSIS — E55.9 VITAMIN D DEFICIENCY: ICD-10-CM

## 2023-11-17 DIAGNOSIS — Z90.09 HISTORY OF LOBECTOMY OF THYROID: ICD-10-CM

## 2023-11-17 RX ORDER — LEVOTHYROXINE SODIUM 0.03 MG/1
TABLET ORAL
Qty: 135 TABLET | Refills: 1 | Status: SHIPPED | OUTPATIENT
Start: 2023-11-17

## 2023-12-12 ENCOUNTER — APPOINTMENT (OUTPATIENT)
Dept: LAB | Facility: HOSPITAL | Age: 68
End: 2023-12-12
Payer: MEDICARE

## 2023-12-12 DIAGNOSIS — E89.0 POSTOPERATIVE HYPOTHYROIDISM: ICD-10-CM

## 2023-12-12 LAB
T4 FREE SERPL-MCNC: 0.79 NG/DL (ref 0.61–1.12)
TSH SERPL DL<=0.05 MIU/L-ACNC: 2.46 UIU/ML (ref 0.45–4.5)

## 2023-12-12 PROCEDURE — 36415 COLL VENOUS BLD VENIPUNCTURE: CPT

## 2023-12-12 PROCEDURE — 84443 ASSAY THYROID STIM HORMONE: CPT

## 2023-12-12 PROCEDURE — 84439 ASSAY OF FREE THYROXINE: CPT

## 2023-12-18 ENCOUNTER — OFFICE VISIT (OUTPATIENT)
Dept: ENDOCRINOLOGY | Facility: CLINIC | Age: 68
End: 2023-12-18
Payer: MEDICARE

## 2023-12-18 VITALS
SYSTOLIC BLOOD PRESSURE: 100 MMHG | WEIGHT: 132.1 LBS | HEART RATE: 73 BPM | BODY MASS INDEX: 20.69 KG/M2 | DIASTOLIC BLOOD PRESSURE: 65 MMHG

## 2023-12-18 DIAGNOSIS — C73 PAPILLARY MICROCARCINOMA OF THYROID (HCC): Primary | ICD-10-CM

## 2023-12-18 DIAGNOSIS — E55.9 VITAMIN D DEFICIENCY: ICD-10-CM

## 2023-12-18 DIAGNOSIS — Z90.09 HISTORY OF LOBECTOMY OF THYROID: ICD-10-CM

## 2023-12-18 PROCEDURE — 99214 OFFICE O/P EST MOD 30 MIN: CPT | Performed by: INTERNAL MEDICINE

## 2023-12-18 RX ORDER — LEVOTHYROXINE SODIUM 0.07 MG/1
75 TABLET ORAL DAILY
Qty: 90 TABLET | Refills: 3 | Status: SHIPPED | OUTPATIENT
Start: 2023-12-18

## 2023-12-18 NOTE — PROGRESS NOTES
" Yamilka Mcdonald 68 y.o. female MRN: [id number]    Encounter: [id number]      Assessment/Plan     Parathyroid carcinoma, microcarcinoma 0.8 cm classic  Status post left hemithyroidectomy  NATY low risk ; Goal TSH 0.5-2.0   Will increase levothyroxine to 75 mcg orally daily  Repeat TSH, free T4 earliest in 6 to 8 weeks along with TG, TG antibody  Surveillance neck ultrasound 7/2024    3. vitamin D deficiency-continue supplementation, check vitamin D level    CC:  Thyroid carcinoma     History of Present Illness     HPI:  Yamilka Mcdonald is a 68 y.o. female who is here for a follow-up of papillary thyroid carcinoma status post left hemithyroidectomy, vitamin D deficiency    Last seen in 8/2021    per my prior notes  \"Initially noticed a lump in the neck approximately 6 months ago  Ultrasound thyroid 3/2023, confirmed 2.8 X1.7X 2.2 cm left thyroid nodule, TR 3  FNA 4/2023-follicular neoplasm/suspicious for follicular neoplasm; Afirma positive     Patient is status post left hemithyroidectomy on 5/10/23  Final pathology-Thyroid and parathyroid tissue, \"left thyroid lobe\"; lobectomy:      - Papillary thyroid carcinoma, classic, 0.8 cm      - Angioinvasion: Not identified       - Lymphatic invasion: Not identified       - Extrathyroidal extension: Not identified       - All margins negative for carcinoma       - Background thyroid with follicular nodular disease and previous biopsy site changes      - One fragment of normocellular parathyroid tissue\"        USG neck 7/2023: IMPRESSION  No evidence of recurrent or metastatic disease.    Taking levothyroxine 37.5 mcg on 5 days, 50 mcg on 2 days of the week  Compliant, takes on an empty stomach    No symptoms of hypo or hyperthyroidism   Taking D3 2000 IU daily   DEXA scan 6/2023-normal bone density    All other systems were reviewed and are negative.       Review of Systems    Historical Information   No past medical history on file.  Past Surgical History:   Procedure Laterality " Date    BREAST CYST EXCISION Bilateral     benign    COLONOSCOPY      HEMORRHOID SURGERY      1995    HYSTERECTOMY      supracervical / 1998    INCISIONAL BREAST BIOPSY      x2 1996    [place] PRTL THYROID LOBECTOMY UNI W/WO ISTHMUSECTOMY Left 05/10/2023    Procedure: LOBECTOMY THYROID;  Surgeon: Shawn Cosme MD;  Location: [facility];  Service: General    SHOULDER SURGERY      bone spur 1999    US GUIDED THYROID BIOPSY  04/18/2023     Social History   Social History     Substance and Sexual Activity   Alcohol Use Not Currently    Alcohol/week: 1.0 standard drink of alcohol    Types: 1 Glasses of wine per week    Comment: occasional alcohol use      Social History     Substance and Sexual Activity   Drug Use No     Social History     Tobacco Use   Smoking Status Never    Passive exposure: Past   Smokeless Tobacco Never     Family History:   Family History   Problem Relation Age of Onset    Uterine cancer Mother     Cancer Mother     Heart disease Father         cardiac disorder     Diabetes Father     Diabetes unspecified Father     Other Sister         hip arthrosis     Uterine cancer Maternal Aunt     Uterine cancer Maternal Aunt     No Known Problems Paternal Aunt     No Known Problems Paternal Aunt     No Known Problems Paternal Aunt     No Known Problems Paternal Aunt     Cancer Paternal Aunt        Meds/Allergies   Current Outpatient Medications   Medication Sig Dispense Refill    levothyroxine 25 mcg tablet TAKE ONE AND A HALF TABLETS BY MOUTH DAILY 135 tablet 1    Multiple Vitamins-Minerals (CENTRUM SILVER 50+WOMEN PO) Take by mouth       No current facility-administered medications for this visit.     No Known Allergies    Objective   Vitals: Blood pressure 100/65, pulse 73, weight 59.9 kg (132 lb 1.6 oz).    Physical Exam  Constitutional:       Appearance: She is well-developed.   HENT:      Head: Normocephalic and atraumatic.   Eyes:      Conjunctiva/sclera: Conjunctivae normal.      Pupils: Pupils are  equal, round, and reactive to light.   Neck:      Thyroid: No thyromegaly.   Cardiovascular:      Rate and Rhythm: Normal rate and regular rhythm.      Heart sounds: Normal heart sounds. No murmur heard.  Pulmonary:      Effort: Pulmonary effort is normal.      Breath sounds: Normal breath sounds. No wheezing.   Abdominal:      General: There is no distension.      Palpations: Abdomen is soft.      Tenderness: There is no abdominal tenderness.   Musculoskeletal:         General: Normal range of motion.      Cervical back: Normal range of motion and neck supple.   Lymphadenopathy:      Cervical: No cervical adenopathy.   Skin:     General: Skin is warm and dry.   Neurological:      Mental Status: She is alert and oriented to person, place, and time.      Deep Tendon Reflexes: Reflexes normal.      Comments: No tremors on the outstretched arms       Psychiatric:         Behavior: Behavior normal.         The history was obtained from the review of the chart, patient.    Lab Results:   Lab Results   Component Value Date/Time    TSH 3RD GENERATON 2.460 12/12/2023 02:47 PM    TSH 3RD GENERATON 2.544 10/11/2023 11:16 AM    TSH 3RD GENERATON 4.504 (H) 08/15/2023 02:32 PM    Free T4 0.79 12/12/2023 02:47 PM    Free T4 0.86 10/11/2023 11:16 AM    Free T4 0.74 08/15/2023 02:32 PM     Lab Results   Component Value Date    WBC 7.23 03/08/2023    HGB 13.6 03/08/2023    HCT 41.9 03/08/2023    MCV 90 03/08/2023     03/08/2023     Lab Results   Component Value Date    CREATININE 0.55 (L) 03/08/2023    BUN 18 03/08/2023    K 4.8 03/08/2023     03/08/2023    CO2 29 03/08/2023     Lab Results   Component Value Date    HGBA1C 5.9 (H) 08/25/2020         Imaging Studies:   Results for orders placed during the hospital encounter of 03/20/23    US thyroid    Impression  Recommend tissue sampling of the left lower pole nodule described above.    The study was marked in EPIC for significant notification..      Reference: ACR  "Thyroid Imaging, Reporting and Data System (TI-RADS): White Paper of the ACR TI-RADS Committee. J AM Anibal Radiol 2017;14:587-595. (additional recommendations based on American Thyroid Association 2015 guidelines.)      Workstation performed: [id number]      I have personally reviewed pertinent reports.      Portions of the record may have been created with voice recognition software. Occasional wrong word or \"sound a like\" substitutions may have occurred due to the inherent limitations of voice recognition software. Read the chart carefully and recognize, using context, where substitutions have occurred.    "

## 2023-12-18 NOTE — PATIENT INSTRUCTIONS
increase levothyroxine to 75 mcg orally daily  Continue vitamin D supplementation  Repeat labs in 3 months prior to follow-up

## 2024-02-21 PROBLEM — Z01.419 WOMEN'S ANNUAL ROUTINE GYNECOLOGICAL EXAMINATION: Status: RESOLVED | Noted: 2019-05-09 | Resolved: 2024-02-21

## 2024-03-14 ENCOUNTER — APPOINTMENT (OUTPATIENT)
Dept: LAB | Facility: HOSPITAL | Age: 69
End: 2024-03-14
Payer: MEDICARE

## 2024-03-14 DIAGNOSIS — E55.9 VITAMIN D DEFICIENCY: ICD-10-CM

## 2024-03-14 DIAGNOSIS — C73 PAPILLARY MICROCARCINOMA OF THYROID (HCC): ICD-10-CM

## 2024-03-14 DIAGNOSIS — Z90.09 HISTORY OF LOBECTOMY OF THYROID: ICD-10-CM

## 2024-03-14 LAB
25(OH)D3 SERPL-MCNC: 30.6 NG/ML (ref 30–100)
T4 FREE SERPL-MCNC: 1.08 NG/DL (ref 0.61–1.12)
TSH SERPL DL<=0.05 MIU/L-ACNC: 0.47 UIU/ML (ref 0.45–4.5)

## 2024-03-14 PROCEDURE — 82306 VITAMIN D 25 HYDROXY: CPT

## 2024-03-14 PROCEDURE — 84439 ASSAY OF FREE THYROXINE: CPT

## 2024-03-14 PROCEDURE — 84432 ASSAY OF THYROGLOBULIN: CPT

## 2024-03-14 PROCEDURE — 36415 COLL VENOUS BLD VENIPUNCTURE: CPT

## 2024-03-14 PROCEDURE — 84443 ASSAY THYROID STIM HORMONE: CPT

## 2024-03-14 PROCEDURE — 86800 THYROGLOBULIN ANTIBODY: CPT

## 2024-03-16 LAB
THYROGLOB AB SERPL-ACNC: <1 IU/ML (ref 0–0.9)
THYROGLOB SERPL-MCNC: 5.5 NG/ML (ref 1.5–38.5)

## 2024-03-18 ENCOUNTER — OFFICE VISIT (OUTPATIENT)
Dept: ENDOCRINOLOGY | Facility: CLINIC | Age: 69
End: 2024-03-18
Payer: MEDICARE

## 2024-03-18 VITALS
HEART RATE: 92 BPM | DIASTOLIC BLOOD PRESSURE: 70 MMHG | BODY MASS INDEX: 20.4 KG/M2 | OXYGEN SATURATION: 98 % | WEIGHT: 130 LBS | SYSTOLIC BLOOD PRESSURE: 110 MMHG | HEIGHT: 67 IN

## 2024-03-18 DIAGNOSIS — E03.9 HYPOTHYROIDISM (ACQUIRED): ICD-10-CM

## 2024-03-18 DIAGNOSIS — E89.0 POSTOPERATIVE HYPOTHYROIDISM: ICD-10-CM

## 2024-03-18 DIAGNOSIS — E55.9 VITAMIN D DEFICIENCY: ICD-10-CM

## 2024-03-18 DIAGNOSIS — C73 PAPILLARY MICROCARCINOMA OF THYROID (HCC): Primary | ICD-10-CM

## 2024-03-18 PROCEDURE — 99214 OFFICE O/P EST MOD 30 MIN: CPT | Performed by: NURSE PRACTITIONER

## 2024-03-18 NOTE — PROGRESS NOTES
"  Established Patient Progress Note     CC: Endocrinology follow up visit    Impression & Plan:    Problem List Items Addressed This Visit          Endocrine    Papillary microcarcinoma of thyroid (HCC) - Primary     NATY low risk. Goal 0.5-2. Recommend repeat ultrasound head, neck, lymph node mapping.     Component      Latest Ref Rng 3/14/2024   THYROGLOBULIN AB      0.0 - 0.9 IU/mL <1.0    Thyroglobulin-TRISTIN      1.5 - 38.5 ng/mL 5.5               Relevant Orders    US head neck lymph node mapping    Postoperative hypothyroidism     Clinically and biochemically euthyroid on levothyroxine 75 mcg daily. Recommend recheck of TFTs in 3 months.     Component      Latest Ref Rng 3/14/2024   TSH 3RD GENERATON      0.450 - 4.500 uIU/mL 0.467    FREE T4      0.61 - 1.12 ng/dL 1.08                  Other    Vitamin D deficiency     Recommend supplementation 1000 to 2000 IU daily.    Component      Latest Ref Rng 3/14/2024   VITAMIN D, 25-HYDROXY      30.0 - 100.0 ng/mL 30.6                Other Visit Diagnoses       Hypothyroidism (acquired)        Relevant Orders    TSH, 3rd generation    T4, free            History of Present Illness:     Yamilka Mcdonald is a 68 y.o. female with a history of papillary thyroid carcinoma s/p left hemithyroidectomy, vitamin D deficiency. Last seen in December 2023.     Papillary thyroid microcarcinoma, s/p left hemithyroidectomy in May 2023, from Dr. Xavier's office note: \"Ultrasound thyroid 3/2023, confirmed 2.8 X1.7X 2.2 cm left thyroid nodule, TR 3  FNA 4/2023-follicular neoplasm/suspicious for follicular neoplasm; Afirma positive  Final pathology-Thyroid and parathyroid tissue, \"left thyroid lobe\"; lobectomy:      - Papillary thyroid carcinoma, classic, 0.8 cm      - Angioinvasion: Not identified       - Lymphatic invasion: Not identified       - Extrathyroidal extension: Not identified       - All margins negative for carcinoma       - Background thyroid with follicular nodular disease and " "previous biopsy site changes      - One fragment of normocellular parathyroid tissue\"        Ultrasound of head, neck, lymph nodes 7/2023: No evidence of recurrent or metastatic disease.    For hypothyroidism, continues taking 75 mcg daily. Compliant with dosing. Bandar symptoms consistent with hypo/hyperthyroidism.     Dexa scan demonstrated normal bone mineral density in June 2023    Patient Active Problem List   Diagnosis    Menopausal vaginal dryness    Trigger middle finger of left hand    Left-sided low back pain with left-sided sciatica    Arthralgia of cervical spine    Cervical myofascial pain syndrome    Discogenic cervical pain    History of hysterectomy    Papillary microcarcinoma of thyroid (HCC)    Vitamin D deficiency    History of lobectomy of thyroid    Postoperative hypothyroidism      History reviewed. No pertinent past medical history.   Past Surgical History:   Procedure Laterality Date    BREAST CYST EXCISION Bilateral     benign    COLONOSCOPY      HEMORRHOID SURGERY      1995    HYSTERECTOMY      supracervical / 1998    INCISIONAL BREAST BIOPSY      x2 1996    [place] PRTL THYROID LOBECTOMY UNI W/WO ISTHMUSECTOMY Left 05/10/2023    Procedure: LOBECTOMY THYROID;  Surgeon: Shawn Cosme MD;  Location: [facility];  Service: General    SHOULDER SURGERY      bone spur 1999    US GUIDED THYROID BIOPSY  04/18/2023      Family History   Problem Relation Age of Onset    Uterine cancer Mother     Cancer Mother     Heart disease Father         cardiac disorder     Diabetes Father     Diabetes unspecified Father     Other Sister         hip arthrosis     Uterine cancer Maternal Aunt     Uterine cancer Maternal Aunt     No Known Problems Paternal Aunt     No Known Problems Paternal Aunt     No Known Problems Paternal Aunt     No Known Problems Paternal Aunt     Cancer Paternal Aunt      Social History     Tobacco Use    Smoking status: Never     Passive exposure: Past    Smokeless tobacco: Never   Substance " "Use Topics    Alcohol use: Not Currently     Alcohol/week: 1.0 standard drink of alcohol     Types: 1 Glasses of wine per week     Comment: occasional alcohol use      No Known Allergies    Current Outpatient Medications:     levothyroxine 75 mcg tablet, Take 1 tablet (75 mcg total) by mouth daily, Disp: 90 tablet, Rfl: 3    Multiple Vitamins-Minerals (CENTRUM SILVER 50+WOMEN PO), Take by mouth, Disp: , Rfl:     Review of Systems  See HPI.   All other systems reviewed and are negative.      Physical Exam:  Body mass index is 20.36 kg/m².  /70 (BP Location: Left arm, Patient Position: Sitting, Cuff Size: Large)   Pulse 92   Ht 5' 7\" (1.702 m)   Wt 59 kg (130 lb)   SpO2 98%   BMI 20.36 kg/m²    Wt Readings from Last 3 Encounters:   03/18/24 59 kg (130 lb)   12/18/23 59.9 kg (132 lb 1.6 oz)   08/21/23 60.9 kg (134 lb 3.2 oz)     Physical Exam  Vitals reviewed.   Constitutional:       Appearance: Normal appearance.   Cardiovascular:      Rate and Rhythm: Normal rate and regular rhythm.      Pulses: Normal pulses.      Heart sounds: Normal heart sounds.   Pulmonary:      Effort: Pulmonary effort is normal.      Breath sounds: Normal breath sounds.   Skin:     General: Skin is warm and dry.      Capillary Refill: Capillary refill takes less than 2 seconds.   Neurological:      General: No focal deficit present.      Mental Status: She is alert and oriented to person, place, and time.      No tremors with outstretched arms.   Psychiatric:         Mood and Affect: Mood normal.         Behavior: Behavior normal.       Discussed with the patient and all questioned fully answered. She will call me if any problems arise.    Follow-up appointment in 3 months.     Counseled patient on diagnostic results, prognosis, risk and benefit of treatment options, instruction for management, importance of treatment compliance, Risk  factor reduction and impressions    MALVIN Burks    "

## 2024-03-18 NOTE — ASSESSMENT & PLAN NOTE
NATY low risk. Goal 0.5-2. Recommend repeat ultrasound head, neck, lymph node mapping.     Component      Latest Ref Rng 3/14/2024   THYROGLOBULIN AB      0.0 - 0.9 IU/mL <1.0    Thyroglobulin-TRISTIN      1.5 - 38.5 ng/mL 5.5

## 2024-03-18 NOTE — PATIENT INSTRUCTIONS
Please complete lab work and ultrasound a few days before the next appointment.   Lab work sooner, if you experience symptoms.

## 2024-03-18 NOTE — ASSESSMENT & PLAN NOTE
Clinically and biochemically euthyroid on levothyroxine 75 mcg daily. Recommend recheck of TFTs in 3 months.     Component      Latest Ref Rng 3/14/2024   TSH 3RD GENERATON      0.450 - 4.500 uIU/mL 0.467    FREE T4      0.61 - 1.12 ng/dL 1.08

## 2024-03-18 NOTE — ASSESSMENT & PLAN NOTE
Recommend supplementation 1000 to 2000 IU daily.    Component      Latest Ref Rng 3/14/2024   VITAMIN D, 25-HYDROXY      30.0 - 100.0 ng/mL 30.6

## 2024-05-20 ENCOUNTER — OFFICE VISIT (OUTPATIENT)
Dept: OTOLARYNGOLOGY | Facility: CLINIC | Age: 69
End: 2024-05-20
Payer: MEDICARE

## 2024-05-20 VITALS — HEIGHT: 67 IN | BODY MASS INDEX: 20.4 KG/M2 | WEIGHT: 130 LBS | TEMPERATURE: 97.2 F

## 2024-05-20 DIAGNOSIS — J31.0 RHINITIS, CHRONIC: ICD-10-CM

## 2024-05-20 DIAGNOSIS — R49.0 DYSPHONIA: ICD-10-CM

## 2024-05-20 DIAGNOSIS — Z90.09 HISTORY OF LOBECTOMY OF THYROID: ICD-10-CM

## 2024-05-20 DIAGNOSIS — R49.0 HOARSENESS: Primary | ICD-10-CM

## 2024-05-20 DIAGNOSIS — R09.82 POST-NASAL DRIP: ICD-10-CM

## 2024-05-20 PROCEDURE — 31575 DIAGNOSTIC LARYNGOSCOPY: CPT | Performed by: STUDENT IN AN ORGANIZED HEALTH CARE EDUCATION/TRAINING PROGRAM

## 2024-05-20 PROCEDURE — 99204 OFFICE O/P NEW MOD 45 MIN: CPT | Performed by: STUDENT IN AN ORGANIZED HEALTH CARE EDUCATION/TRAINING PROGRAM

## 2024-05-20 NOTE — PROGRESS NOTES
[facility]  [facility]  [facility] Otolaryngology  Otolaryngology -- Head and Neck Surgery New Patient Visit    Yamilka Mcdonald is a 68-year-old who presents with intermittent hoarseness and pain when talking excessively. Her voice intermittently returns to normal without preceding URTI, pain, or effort when speaking. She does not experience voice deterioration, fatigue, difficulty projecting, changes in pitch or range, or running out of air while talking, and her voice does not crack or break. She has no history of acid reflux or water brash but has chronic throat clearing, rhinitis, nasal, and postnasal drainage. She has no history of halitosis, dry or sore throat, or globus pharyngeus. She has no neurological disorders, arthritis, asthma, COPD, aneurysm of the thoracic aorta, or Sjögren’s syndrome. She had a hemithyroidectomy for thyroid cancer a year ago and has a follow-up ultrasound scheduled next month. She has no history of laryngeal cancer, vocal fold nodules, or vocal fold paralysis. There is no history of intubation or surgeries involving the brain, spine, neck, or chest before the onset of dysphonia, nor head and neck radiotherapy. Her occupation does not require extensive voice use, and she has no history of voice abuse, smoking, or alcohol consumption. There is no history of neck or laryngeal trauma or prior emotional or psychological trauma preceding the voice change. She is not on antihypertensives, antipsychotics, antidepressants, osteoporosis medications, inhaled medications, antibiotics, or steroids. She has no history of diabetes. The patient denies any history of dysphagia, odynophagia, otalgia, hemoptysis, other body swellings, night sweats, fever/chills, anorexia, or weight loss.    Review of systems: Pertinent review of systems documented in the HPI. 10 point ROS documented in a separate note, as necessary.  Results reviewed; images from any scan have been  "personally reviewed:        The past medical, surgical, social and family history have been reviewed as documented in today's record.  Past Medical History:   Diagnosis Date    Dizziness     Headache(784.0) years    migraines    Migraine      Past Surgical History:   Procedure Laterality Date    BREAST CYST EXCISION Bilateral     benign    COLONOSCOPY      HEMORRHOID SURGERY      1995    HYSTERECTOMY      supracervical / 1998    INCISIONAL BREAST BIOPSY      x2 1996    NE PRTL THYROID LOBECTOMY UNI W/WO ISTHMUSECTOMY Left 05/10/2023    Procedure: LOBECTOMY THYROID;  Surgeon: Shawn Cosme MD;  Location: [facility];  Service: General    SHOULDER SURGERY      bone spur 1999    THYROID SURGERY  May 2023    US GUIDED THYROID BIOPSY  04/18/2023     Family History   Problem Relation Age of Onset    Uterine cancer Mother     Cancer Mother     Heart disease Father         cardiac disorder     Diabetes Father     Diabetes unspecified Father     Other Sister         hip arthrosis     Uterine cancer Maternal Aunt     Uterine cancer Maternal Aunt     Cancer Paternal Aunt     No Known Problems Paternal Aunt     No Known Problems Paternal Aunt     No Known Problems Paternal Aunt     Cancer Paternal Aunt      Current Outpatient Medications on File Prior to Visit   Medication Sig Dispense Refill    levothyroxine 75 mcg tablet Take 1 tablet (75 mcg total) by mouth daily 90 tablet 3    Multiple Vitamins-Minerals (CENTRUM SILVER 50+WOMEN PO) Take by mouth       No current facility-administered medications on file prior to visit.      Physical exam:   Temp (!) 97.2 °F (36.2 °C) (Temporal)   Ht 5' 7\" (1.702 m)   Wt 59 kg (130 lb)   BMI 20.36 kg/m²   Head: Atraumatic, no visible scalp lesions, parotid and submandibular salivary glands non-tender to palpation and without masses bilaterally.   Neck:  No visible or palpable cervical lesions or lymphadenopathy, thyroid gland is normal in size and symmetry and without masses, normal " laryngeal elevation with swallowing scar  Ears:    Right ear :  Auricle normal in appearance, mastoid prominence non-tender, external auditory canal clear. Tympanic membranes intact.   Left ear :  Auricle normal in appearance, mastoid prominence non-tender, external auditory canal clear . Tympanic membranes intact.   Nose/Sinuses:  External appearance unremarkable, no maxillary or frontal sinus tenderness to palpation bilaterally. Anterior rhinoscopy reveals:   Oral Cavity:  Moist mucus membranes, gums and dentition unremarkable, no oral mucosal masses or lesions, floor of mouth soft, tongue mobile without masses or lesions.   Oropharynx:  Base of tongue soft and without masses, tonsils bilaterally unremarkable, soft palate mucosa unremarkable.      Eyes:  Extra-ocular movements intact, pupils equally round and reactive to light and accommodation, the lids and conjunctivae are normal in appearance.  Constitutional:  Well developed, well nourished and groomed, in no acute distress.   Cardiovascular:  Normal rate and rhythm, no palpable thrills, no jugulovenous distension observed.  Respiratory:  Normal respiratory effort without evidence of retractions or use of accessory muscles.  Neurologic:  Cranial nerves II-XII intact bilaterally.  Abdomen: Soft and lax  Extremities: No bruises   Psychiatric:  Alert and oriented to time, place and person.  Procedures  Flexible laryngoscopy performed:  The nasal cavities were decongested with lidocaine and oxymetazoline spray.   Endoscopy type:   Flexible laryngoscopy performed:  Fiberoptic scope advanced through left nare, findings:  Nasal cavity: Septum deviated to the left, no polyps or mucopus.  Nasopharynx: unremarkable, no masses or lesions, eustachian tube orifi and Fossae of Rosenmuller unremakable.  Oropharynx: mucosa moist, no masses or lesions, tongue base, lateral and posterior walls normal  Larynx: True vocal folds mobile, no masses or lesions, widely patent glottic  "chink, evidence of PND.  Hypopharynx: Pyriform sinuses clear without masses or pooling.  Post-cricoid area unremarkable.     Results: look to the examination.  The patient tolerated the procedure well.    Assessment:   1. Hoarseness        2. Dysphonia        3. History of lobectomy of thyroid        4. Post-nasal drip        5. Rhinitis, chronic          Orders  No orders of the defined types were placed in this encounter.    Discussion/Plan:    \"Flexible laryngoscopy showed a normal upper airway with only PND. It is recommended to use saline rinse and Flonase. The patient is already scheduled for a follow-up thyroid ultrasound next month and thyroid blood workup.\"  "

## 2024-07-01 ENCOUNTER — HOSPITAL ENCOUNTER (OUTPATIENT)
Dept: RADIOLOGY | Facility: HOSPITAL | Age: 69
Discharge: HOME | End: 2024-07-01
Payer: MEDICARE

## 2024-07-01 DIAGNOSIS — C73 PAPILLARY MICROCARCINOMA OF THYROID (HCC): ICD-10-CM

## 2024-07-01 PROCEDURE — 76536 US EXAM OF HEAD AND NECK: CPT

## 2024-07-18 ENCOUNTER — APPOINTMENT (OUTPATIENT)
Dept: LAB | Facility: HOSPITAL | Age: 69
End: 2024-07-18
Payer: MEDICARE

## 2024-07-18 DIAGNOSIS — E03.9 HYPOTHYROIDISM (ACQUIRED): ICD-10-CM

## 2024-07-18 LAB
T4 FREE SERPL-MCNC: 0.96 NG/DL (ref 0.61–1.12)
TSH SERPL DL<=0.05 MIU/L-ACNC: 0.94 UIU/ML (ref 0.45–4.5)

## 2024-07-18 PROCEDURE — 84443 ASSAY THYROID STIM HORMONE: CPT

## 2024-07-18 PROCEDURE — 36415 COLL VENOUS BLD VENIPUNCTURE: CPT

## 2024-07-18 PROCEDURE — 84439 ASSAY OF FREE THYROXINE: CPT

## 2024-07-22 ENCOUNTER — OFFICE VISIT (OUTPATIENT)
Dept: ENDOCRINOLOGY | Facility: CLINIC | Age: 69
End: 2024-07-22
Payer: MEDICARE

## 2024-07-22 VITALS
WEIGHT: 128.6 LBS | DIASTOLIC BLOOD PRESSURE: 60 MMHG | SYSTOLIC BLOOD PRESSURE: 110 MMHG | HEART RATE: 62 BPM | BODY MASS INDEX: 20.18 KG/M2 | OXYGEN SATURATION: 98 % | HEIGHT: 67 IN

## 2024-07-22 DIAGNOSIS — E89.0 POSTOPERATIVE HYPOTHYROIDISM: ICD-10-CM

## 2024-07-22 DIAGNOSIS — C73 PAPILLARY MICROCARCINOMA OF THYROID (HCC): Primary | ICD-10-CM

## 2024-07-22 DIAGNOSIS — E55.9 VITAMIN D DEFICIENCY: ICD-10-CM

## 2024-07-22 PROCEDURE — 99214 OFFICE O/P EST MOD 30 MIN: CPT | Performed by: NURSE PRACTITIONER

## 2024-07-22 NOTE — ASSESSMENT & PLAN NOTE
TSH goal 0.5-2.0   No recurrence demonstrated on thyroid ultrasound report from July 2024.   Thyroglobulin panel due March 2025.

## 2024-07-22 NOTE — PROGRESS NOTES
"  Established Patient Progress Note     CC: Endocrinology follow up visit    Impression & Plan:    Problem List Items Addressed This Visit          Endocrine    Papillary microcarcinoma of thyroid (HCC) - Primary     TSH goal 0.5-2.0   No recurrence demonstrated on thyroid ultrasound report from July 2024.   Thyroglobulin panel due March 2025.          Postoperative hypothyroidism     TSH at goal.  Clinically euthyroid.  Continue levothyroxine 75 mcg daily.         Relevant Orders    TSH, 3rd generation    T4, free       Other    Vitamin D deficiency    Relevant Orders    Vitamin D 25 hydroxy       History of Present Illness:     Yamilka Mcdonald is a 68 y.o. female with a history of papillary thyroid carcinoma s/p left hemithyroidectomy, vitamin D deficiency.     Papillary thyroid microcarcinoma, s/p left hemithyroidectomy in May 2023, from Dr. Xavier's office note: \"Ultrasound thyroid 3/2023, confirmed 2.8 X1.7X 2.2 cm left thyroid nodule, TR 3  FNA 4/2023-follicular neoplasm/suspicious for follicular neoplasm; Afirma positive  Final pathology-Thyroid and parathyroid tissue, \"left thyroid lobe\"; lobectomy:      - Papillary thyroid carcinoma, classic, 0.8 cm      - Angioinvasion: Not identified       - Lymphatic invasion: Not identified       - Extrathyroidal extension: Not identified       - All margins negative for carcinoma       - Background thyroid with follicular nodular disease and previous biopsy site changes      - One fragment of normocellular parathyroid tissue\"        Ultrasound of head, neck, lymph nodes 7/2024: No evidence of recurrent or metastatic disease.     For hypothyroidism, continues taking 75 mcg daily. Compliant with dosing first morning at least 30-60 minutes before eating. Denies symptoms consistent with hypo/hyperthyroidism.      Dexa scan demonstrated normal bone mineral density in June 2023.    Patient Active Problem List   Diagnosis    Menopausal vaginal dryness    Trigger middle finger of " left hand    Left-sided low back pain with left-sided sciatica    Arthralgia of cervical spine    Cervical myofascial pain syndrome    Discogenic cervical pain    History of hysterectomy    Papillary microcarcinoma of thyroid (HCC)    Vitamin D deficiency    History of lobectomy of thyroid    Postoperative hypothyroidism      Past Medical History:   Diagnosis Date    Dizziness     Headache(784.0) years    migraines    Migraine       Past Surgical History:   Procedure Laterality Date    BREAST CYST EXCISION Bilateral     benign    COLONOSCOPY      HEMORRHOID SURGERY      1995    HYSTERECTOMY      supracervical / 1998    INCISIONAL BREAST BIOPSY      x2 1996    MA PRTL THYROID LOBECTOMY UNI W/WO ISTHMUSECTOMY Left 05/10/2023    Procedure: LOBECTOMY THYROID;  Surgeon: Shawn Cosme MD;  Location: [facility];  Service: General    SHOULDER SURGERY      bone spur 1999    THYROID SURGERY  May 2023    US GUIDED THYROID BIOPSY  04/18/2023      Family History   Problem Relation Age of Onset    Uterine cancer Mother     Cancer Mother     Heart disease Father         cardiac disorder     Diabetes Father     Diabetes unspecified Father     Other Sister         hip arthrosis     Uterine cancer Maternal Aunt     Uterine cancer Maternal Aunt     Cancer Paternal Aunt     No Known Problems Paternal Aunt     No Known Problems Paternal Aunt     No Known Problems Paternal Aunt     Cancer Paternal Aunt      Social History     Tobacco Use    Smoking status: Never     Passive exposure: Past    Smokeless tobacco: Never   Substance Use Topics    Alcohol use: Not Currently     Alcohol/week: 1.0 standard drink of alcohol     Types: 1 Glasses of wine per week     Comment: occasional alcohol use      No Known Allergies    Current Outpatient Medications:     levothyroxine 75 mcg tablet, Take 1 tablet (75 mcg total) by mouth daily, Disp: 90 tablet, Rfl: 3    Multiple Vitamins-Minerals (CENTRUM SILVER 50+WOMEN PO), Take by mouth, Disp: , Rfl:  "    Review of Systems  See HPI.   All other systems reviewed and are negative.    Physical Exam:  Body mass index is 20.14 kg/m².  /60 (BP Location: Left arm, Patient Position: Sitting, Cuff Size: Large)   Pulse 62   Ht 5' 7\" (1.702 m)   Wt 58.3 kg (128 lb 9.6 oz)   SpO2 98%   BMI 20.14 kg/m²    Wt Readings from Last 3 Encounters:   07/22/24 58.3 kg (128 lb 9.6 oz)   05/20/24 59 kg (130 lb)   03/18/24 59 kg (130 lb)        Physical Exam  Vitals reviewed.   Constitutional:       Appearance: Normal appearance.  No cervical lymph nodes.    Cardiovascular:      Rate and Rhythm: Normal rate and regular rhythm.      Pulses: Normal pulses.      Heart sounds: Normal heart sounds.   Pulmonary:      Effort: Pulmonary effort is normal.      Breath sounds: Normal breath sounds.   Skin:     General: Skin is warm and dry.      Capillary Refill: Capillary refill takes less than 2 seconds.   Neurological:      General: No focal deficit present.      Mental Status: She is alert and oriented to person, place, and time.      No tremors with outstretched arms.   Psychiatric:         Mood and Affect: Mood normal.         Behavior: Behavior normal.       Labs:   Component      Latest Ref Rng 3/14/2024 7/18/2024   VITAMIN D, 25-HYDROXY      30.0 - 100.0 ng/mL 30.6     TSH 3RD GENERATON      0.450 - 4.500 uIU/mL 0.467  0.935    FREE T4      0.61 - 1.12 ng/dL 1.08  0.96    THYROGLOBULIN AB      0.0 - 0.9 IU/mL <1.0     Thyroglobulin-TRISTIN      1.5 - 38.5 ng/mL 5.5           Discussed with the patient and all questioned fully answered. She will call me if any problems arise.    Follow-up appointment in 3 months.     Counseled patient on diagnostic results, prognosis, risk and benefit of treatment options, instruction for management, importance of treatment compliance, Risk  factor reduction and impressions    MALVIN Burks    "

## 2024-09-30 DIAGNOSIS — C73 PAPILLARY MICROCARCINOMA OF THYROID (HCC): ICD-10-CM

## 2024-09-30 DIAGNOSIS — E55.9 VITAMIN D DEFICIENCY: ICD-10-CM

## 2024-09-30 DIAGNOSIS — Z90.09 HISTORY OF LOBECTOMY OF THYROID: ICD-10-CM

## 2024-09-30 RX ORDER — LEVOTHYROXINE SODIUM 75 UG/1
75 TABLET ORAL DAILY
Qty: 90 TABLET | Refills: 1 | Status: SHIPPED | OUTPATIENT
Start: 2024-09-30

## 2024-10-31 ENCOUNTER — APPOINTMENT (OUTPATIENT)
Dept: LAB | Facility: HOSPITAL | Age: 69
End: 2024-10-31
Payer: MEDICARE

## 2024-10-31 DIAGNOSIS — E55.9 VITAMIN D DEFICIENCY: ICD-10-CM

## 2024-10-31 DIAGNOSIS — E89.0 POSTOPERATIVE HYPOTHYROIDISM: ICD-10-CM

## 2024-10-31 LAB
25(OH)D3 SERPL-MCNC: 27.9 NG/ML (ref 30–100)
T4 FREE SERPL-MCNC: 1.09 NG/DL (ref 0.61–1.12)
TSH SERPL DL<=0.05 MIU/L-ACNC: 0.91 UIU/ML (ref 0.45–4.5)

## 2024-10-31 PROCEDURE — 84439 ASSAY OF FREE THYROXINE: CPT

## 2024-10-31 PROCEDURE — 82306 VITAMIN D 25 HYDROXY: CPT

## 2024-10-31 PROCEDURE — 84443 ASSAY THYROID STIM HORMONE: CPT

## 2024-10-31 PROCEDURE — 36415 COLL VENOUS BLD VENIPUNCTURE: CPT

## 2024-11-04 ENCOUNTER — OFFICE VISIT (OUTPATIENT)
Dept: ENDOCRINOLOGY | Facility: CLINIC | Age: 69
End: 2024-11-04
Payer: MEDICARE

## 2024-11-04 VITALS
WEIGHT: 129.6 LBS | HEIGHT: 67 IN | HEART RATE: 85 BPM | SYSTOLIC BLOOD PRESSURE: 120 MMHG | DIASTOLIC BLOOD PRESSURE: 70 MMHG | BODY MASS INDEX: 20.34 KG/M2 | OXYGEN SATURATION: 97 %

## 2024-11-04 DIAGNOSIS — C73 PAPILLARY MICROCARCINOMA OF THYROID (HCC): ICD-10-CM

## 2024-11-04 DIAGNOSIS — E55.9 VITAMIN D DEFICIENCY: ICD-10-CM

## 2024-11-04 DIAGNOSIS — Z13.1 SCREENING FOR DIABETES MELLITUS: ICD-10-CM

## 2024-11-04 DIAGNOSIS — E89.0 POSTOPERATIVE HYPOTHYROIDISM: Primary | ICD-10-CM

## 2024-11-04 DIAGNOSIS — Z13.220 SCREENING FOR LIPID DISORDERS: ICD-10-CM

## 2024-11-04 PROCEDURE — 99204 OFFICE O/P NEW MOD 45 MIN: CPT | Performed by: NURSE PRACTITIONER

## 2024-11-04 PROCEDURE — 99214 OFFICE O/P EST MOD 30 MIN: CPT | Performed by: NURSE PRACTITIONER

## 2024-11-04 NOTE — ASSESSMENT & PLAN NOTE
Family history of diabetes mellitus.  Last lipid panel from March 2023.   Orders:    Lipid panel; Future

## 2024-11-04 NOTE — PROGRESS NOTES
"Ambulatory Visit  Name: Yamilka Mcdonald      : 1955      MRN: [id number]  Encounter Provider: MALVIN Burks  Encounter Date: 2024   Encounter department: [facility]    Assessment & Plan  Postoperative hypothyroidism  Clinically and biochemically euthyroid.  TSH goal 0.5-2.   Continue on levothyroxine 75 mcg daily.       Orders:    TSH, 3rd generation; Future    T4, free; Future    Vitamin D deficiency  Patient unsure of dose  Recommend due to vitamin D level 27.9, will recommend increase after patient confirms.   Orders:    Vitamin D 25 hydroxy; Future    Papillary microcarcinoma of thyroid (HCC)  Thyroglobulin levels appropriate from 2024.   Ultrasound head, neck, lymph node from 2024 did not demonstrate recurrence or metastatic changes.  Orders:    Thyroglobulin; Future    Screening for diabetes mellitus  Family history of diabetes mellitus   Last hemoglobin A1c in prediabetic range from 2020.   Orders:    Comprehensive metabolic panel; Future    Hemoglobin A1C; Future    Screening for lipid disorders  Family history of diabetes mellitus.  Last lipid panel from 2023.   Orders:    Lipid panel; Future      History of Present Illness     Yamilka Mcdonald is a 69 y.o. female with a history of papillary thyroid carcinoma s/p left hemithyroidectomy, vitamin D deficiency.     Papillary thyroid microcarcinoma, s/p left hemithyroidectomy in May 2023,   -Ultrasound thyroid 3/2023, confirmed 2.8 X1.7X 2.2 cm left thyroid nodule, TR 3  -FNA 2023-follicular neoplasm/suspicious for follicular neoplasm; Afirma positive  -Final pathology from 2023-Thyroid and parathyroid tissue, \"left thyroid lobe\"; lobectomy:      - Papillary thyroid carcinoma, classic, 0.8 cm      - Angioinvasion: Not identified       - Lymphatic invasion: Not identified       - Extrathyroidal extension: Not identified       - All margins negative for carcinoma       - " "Background thyroid with follicular nodular disease and previous biopsy site changes      - One fragment of normocellular parathyroid tissue\"        Ultrasound of head, neck, lymph nodes 7/2024 demonstrated no evidence of recurrent or metastatic disease.     For hypothyroidism, continues taking 75 mcg daily, taken regularly and properly. Denies symptoms consistent with hypo/hyperthyroidism.      Dexa scan demonstrated normal bone mineral density in June 2023.       History obtained from : patient    Review of Systems  See HPI.   All other systems reviewed and are negative.      Medical History Reviewed by provider this encounter:  Tobacco  Allergies  Meds  Problems  Med Hx  Surg Hx  Fam Hx       Current Outpatient Medications on File Prior to Visit   Medication Sig Dispense Refill    levothyroxine 75 mcg tablet TAKE ONE TABLET BY MOUTH DAILY 90 tablet 1    Multiple Vitamins-Minerals (CENTRUM SILVER 50+WOMEN PO) Take by mouth       No current facility-administered medications on file prior to visit.          Objective     /70 (BP Location: Left arm, Patient Position: Sitting, Cuff Size: Standard)   Pulse 85   Ht 5' 7\" (1.702 m)   Wt 58.8 kg (129 lb 9.6 oz)   SpO2 97%   BMI 20.30 kg/m²        Physical Exam  Vitals reviewed.   Constitutional:       Appearance: Normal appearance.   Cardiovascular:      Rate and Rhythm: Normal rate and regular rhythm.      Pulses: Normal pulses.      Heart sounds: Normal heart sounds.   Pulmonary:      Effort: Pulmonary effort is normal.      Breath sounds: Normal breath sounds.   Skin:     General: Skin is warm and dry.      Capillary Refill: Capillary refill takes less than 2 seconds.   Neurological:      General: No focal deficit present.      Mental Status: She is alert and oriented to person, place, and time.      No tremors with outstretched arms.   Psychiatric:         Mood and Affect: Mood normal.         Behavior: Behavior normal.     Labs:   Component      " Latest Ref Rng 10/31/2024   VITAMIN D, 25-HYDROXY      30.0 - 100.0 ng/mL 27.9 (L)    TSH 3RD GENERATON      0.450 - 4.500 uIU/mL 0.914    FREE T4      0.61 - 1.12 ng/dL 1.09       Administrative Statements

## 2024-11-04 NOTE — ASSESSMENT & PLAN NOTE
Patient unsure of dose  Recommend due to vitamin D level 27.9, will recommend increase after patient confirms.   Orders:    Vitamin D 25 hydroxy; Future

## 2024-11-04 NOTE — ASSESSMENT & PLAN NOTE
Thyroglobulin levels appropriate from March 2024.   Ultrasound head, neck, lymph node from July 2024 did not demonstrate recurrence or metastatic changes.  Orders:    Thyroglobulin; Future

## 2024-11-04 NOTE — ASSESSMENT & PLAN NOTE
Family history of diabetes mellitus   Last hemoglobin A1c in prediabetic range from August 2020.   Orders:    Comprehensive metabolic panel; Future    Hemoglobin A1C; Future

## 2024-11-04 NOTE — ASSESSMENT & PLAN NOTE
Clinically and biochemically euthyroid.  TSH goal 0.5-2.   Continue on levothyroxine 75 mcg daily.       Orders:    TSH, 3rd generation; Future    T4, free; Future

## 2024-12-04 PROBLEM — Z13.1 SCREENING FOR DIABETES MELLITUS: Status: RESOLVED | Noted: 2024-11-04 | Resolved: 2024-12-04

## 2024-12-04 PROBLEM — Z13.220 SCREENING FOR LIPID DISORDERS: Status: RESOLVED | Noted: 2024-11-04 | Resolved: 2024-12-04

## 2025-02-11 ENCOUNTER — OFFICE VISIT (OUTPATIENT)
Age: 70
End: 2025-02-11
Payer: MEDICARE

## 2025-02-11 VITALS — TEMPERATURE: 98.1 F

## 2025-02-11 DIAGNOSIS — L82.1 SEBORRHEIC KERATOSES: ICD-10-CM

## 2025-02-11 DIAGNOSIS — L81.4 LENTIGO: ICD-10-CM

## 2025-02-11 DIAGNOSIS — D22.9 MULTIPLE MELANOCYTIC NEVI: ICD-10-CM

## 2025-02-11 DIAGNOSIS — D18.01 CHERRY ANGIOMA: Primary | ICD-10-CM

## 2025-02-11 DIAGNOSIS — L66.12 FRONTAL FIBROSING ALOPECIA: ICD-10-CM

## 2025-02-11 PROCEDURE — 99213 OFFICE O/P EST LOW 20 MIN: CPT | Performed by: DERMATOLOGY

## 2025-02-11 NOTE — PROGRESS NOTES
"[facility]atology [facility] Note     Patient Name: Yamilka Mcdonald  Encounter Date: 2/11/25     Have you been cared for by a [facility] Dermatologist in the last 3 years and, if so, which description applies to you?    Yes.  I have been here within the last 3 years, and my medical history has NOT changed since that time.  I am FEMALE/of child-bearing potential.    REVIEW OF SYSTEMS:  Have you recently had or currently have any of the following? No changes in my recent health.   PAST MEDICAL HISTORY:  Have you personally ever had or currently have any of the following?  If \"YES,\" then please provide more detail. No changes in my medical history.   HISTORY OF IMMUNOSUPPRESSION: Do you have a history of any of the following:  Systemic Immunosuppression such as Diabetes, Biologic or Immunotherapy, Chemotherapy, Organ Transplantation, Bone Marrow Transplantation or Prednisone?  No     Answering \"YES\" requires the addition of the dotphrase \"IMMUNOSUPPRESSED\" as the first diagnosis of the patient's visit.   FAMILY HISTORY:  Any \"first degree relatives\" (parent, brother, sister, or child) with the following?    No changes in my family's known health.   PATIENT EXPERIENCE:    Do you want the Dermatologist to perform a COMPLETE skin exam today including a clinical examination under the \"bra and underwear\" areas?  Yes  If necessary, do we have your permission to call and leave a detailed message on your Preferred Phone number that includes your specific medical information?  Yes      No Known Allergies   Current Outpatient Medications:   •  levothyroxine 75 mcg tablet, TAKE ONE TABLET BY MOUTH DAILY, Disp: 90 tablet, Rfl: 1  •  Multiple Vitamins-Minerals (CENTRUM SILVER 50+WOMEN PO), Take by mouth, Disp: , Rfl:           Whom besides the patient is providing clinical information about today's encounter?   NO ADDITIONAL HISTORIAN (patient alone provided history)    Physical Exam and Assessment/Plan by Diagnosis:    FRONTAL " "FIBROSING ALOPECIA    History of Present Condition:  noted on exam    Physical Exam: band of hairloss frontal scalp    Well appearing, in no acute distress. Well nourishes, well developed. Alert and oriented. A focused skin exam was performed including scalp and hair. The exam was negative except as noted below:     Additional findings:  patient has noticed recession of frontal scalp, asymptomatic and no prior treatment    Assessment and Plan:  - History and clinical exam consistent with frontal fibrosing alopecia.   - Educated patient that the underlying cause of frontal fibrosing alopecia is unknown.   - Educated that frontal fibrosing alopecia is a type of scarring hair loss and that any hair lost will unfortunately not grow back.   - Educated patient that the primary goal of interventions is to prevent further hair loss and hair line recession  - Discussed therapeutic ladder, including minoxidil, low level laser therapy, spironolactone, finasteride, PRP, and hair transplantation      After an in depth discussion of the possible treatments including risks and benefits, patient elected to proceed as below:     Patient does not wish to proceed with any treatments at this time    MELANOCYTIC NEVI (\"Moles\")    Physical Exam:  Anatomic Location Affected:   Mostly on sun-exposed areas of the trunk and extremities  Morphological Description:  Scattered, 1-4mm round to ovoid, symmetrical-appearing, even bordered, skin colored to dark brown macules/papules, mostly in sun-exposed areas  Pertinent Positives:  Pertinent Negatives:    Additional History of Present Condition:      Assessment and Plan:  Based on a thorough discussion of this condition and the management approach to it (including a comprehensive discussion of the known risks, side effects and potential benefits of treatment), the patient (family) agrees to implement the following specific plan:  When outside we recommend using a wide brim hat, sunglasses, long " "sleeve and pants, sunscreen with SPF 30+ with reapplication every 2 hours, or SPF specific clothing   Benign, reassured  Annual skin check     Melanocytic Nevi  Melanocytic nevi (\"moles\") are tan or brown, raised or flat areas of the skin which have an increased number of melanocytes. Melanocytes are the cells in our body which make pigment and account for skin color.    Some moles are present at birth (I.e., \"congenital nevi\"), while others come up later in life (i.e., \"acquired nevi\").  The sun can stimulate the body to make more moles.  Sunburns are not the only thing that triggers more moles.  Chronic sun exposure can do it too.     Clinically distinguishing a healthy mole from melanoma may be difficult, even for experienced dermatologists. The \"ABCDE's\" of moles have been suggested as a means of helping to alert a person to a suspicious mole and the possible increased risk of melanoma.  The suggestions for raising alert are as follows:    Asymmetry: Healthy moles tend to be symmetric, while melanomas are often asymmetric.  Asymmetry means if you draw a line through the mole, the two halves do not match in color, size, shape, or surface texture. Asymmetry can be a result of rapid enlargement of a mole, the development of a raised area on a previously flat lesion, scaling, ulceration, bleeding or scabbing within the mole.  Any mole that starts to demonstrate \"asymmetry\" should be examined promptly by a board certified dermatologist.     Border: Healthy moles tend to have discrete, even borders.  The border of a melanoma often blends into the normal skin and does not sharply delineate the mole from normal skin.  Any mole that starts to demonstrate \"uneven borders\" should be examined promptly by a board certified dermatologist.     Color: Healthy moles tend to be one color throughout.  Melanomas tend to be made up of different colors ranging from dark black, blue, white, or red.  Any mole that demonstrates a color " "change should be examined promptly by a board certified dermatologist.     Diameter: Healthy moles tend to be smaller than 0.6 cm in size; an exception are \"congenital nevi\" that can be larger.  Melanomas tend to grow and can often be greater than 0.6 cm (1/4 of an inch, or the size of a pencil eraser). This is only a guideline, and many normal moles may be larger than 0.6 cm without being unhealthy.  Any mole that starts to change in size (small to bigger or bigger to smaller) should be examined promptly by a board certified dermatologist.     Evolving: Healthy moles tend to \"stay the same.\"  Melanomas may often show signs of change or evolution such as a change in size, shape, color, or elevation.  Any mole that starts to itch, bleed, crust, burn, hurt, or ulcerate or demonstrate a change or evolution should be examined promptly by a board certified dermatologist.      LENTIGO    Physical Exam:  Anatomic Location Affected:  trunk, arms  Morphological Description:  Light brown macules  Pertinent Positives:  Pertinent Negatives:    Assessment and Plan:  Based on a thorough discussion of this condition and the management approach to it (including a comprehensive discussion of the known risks, side effects and potential benefits of treatment), the patient (family) agrees to implement the following specific plan:  When outside we recommend using a wide brim hat, sunglasses, long sleeve and pants, sunscreen with SPF 30+ with reapplication every 2 hours, or SPF specific clothing     What is a lentigo?  A lentigo is a pigmented flat or slightly raised lesion with a clearly defined edge. Unlike an ephelis (freckle), it does not fade in the winter months. There are several kinds of lentigo.  The name lentigo originally referred to its appearance resembling a small lentil. The plural of lentigo is lentigines, although “lentigos” is also in common use.    Who gets lentigines?  Lentigines can affect males and females of all " ages and races. Solar lentigines are especially prevalent in fair skinned adults. Lentigines associated with syndromes are present at birth or arise during childhood.    What causes lentigines?  Common forms of lentigo are due to exposure to ultraviolet radiation:  Sun damage including sunburn   Indoor tanning   Phototherapy, especially photochemotherapy (PUVA)    Ionizing radiation, eg radiation therapy, can also cause lentigines.  Several familial syndromes associated with widespread lentigines originate from mutations in Adarsh-MAP kinase, mTOR signaling and PTEN pathways.    What is the treatment for lentigines?  Most lentigines are left alone. Attempts to lighten them may not be successful. The following approaches are used:  SPF 50+ broad-spectrum sunscreen   Hydroquinone bleaching cream   Alpha hydroxy acids   Vitamin C   Retinoids   Azelaic acid   Chemical peels  Individual lesions can be permanently removed using:  Cryotherapy   Intense pulsed light   Pigment lasers    How can lentigines be prevented?  Lentigines associated with exposure ultraviolet radiation can be prevented by very careful sun protection. Clothing is more successful at preventing new lentigines than are sunscreens.    What is the outlook for lentigines?  Lentigines usually persist. They may increase in number with age and sun exposure. Some in sun-protected sites may fade and disappear.    HARLEY ANGIOMAS    Physical Exam:  Anatomic Location Affected:  Mostly on sun-exposed areas of the trunk and extremities  Morphological Description:  Scattered cherry red, 1-4 mm papules.  Pertinent Positives:  Pertinent Negatives:    Assessment and Plan:  Based on a thorough discussion of this condition and the management approach to it (including a comprehensive discussion of the known risks, side effects and potential benefits of treatment), the patient (family) agrees to implement the following specific plan:  Monitor for changes  Benign,  "reassured    Assessment and Plan:    Cherry angioma, also known as Glass de Medhat spots, are benign vascular skin lesions. A \"cherry angioma\" is a firm red, blue or purple papule, 0.1-1 cm in diameter. When thrombosed, they can appear black in colour until evaluated with a dermatoscope when the red or purple colour is more easily seen. Cherry angioma may develop on any part of the body but most often appear on the scalp, face, lips and trunk.  An angioma is due to proliferating endothelial cells; these are the cells that line the inside of a blood vessel.    Angiomas can arise in early life or later in life; the most common type of angioma is a cherry angioma.  Cherry angiomas are very common in males and females of any age or race. They are more noticeable in white skin than in skin of colour. They markedly increase in number from about the age of 40. There may be a family history of similar lesions. Eruptive cherry angiomas have been rarely reported to be associated with internal malignancy. The cause of angiomas is unknown. Genetic analysis of cherry angiomas has shown that they frequently carry specific somatic missense mutations in the GNAQ and GNA11 (Q209H) genes, which are involved in other vascular and melanocytic proliferations.      SEBORRHEIC KERATOSIS; NON-INFLAMED    Physical Exam:  Anatomic Location Affected: Mostly on sun-exposed areas of the trunk and extremities  Morphological Description:  Flat and raised, waxy, smooth to warty textured, yellow to brownish-grey to dark brown to blackish, discrete, \"stuck-on\" appearing papules.  Pertinent Positives:  Pertinent Negatives:    Assessment and Plan:  Based on a thorough discussion of this condition and the management approach to it (including a comprehensive discussion of the known risks, side effects and potential benefits of treatment), the patient (family) agrees to implement the following specific plan:  Monitor for changes  Benign, " "reassured    Seborrheic Keratosis  A seborrheic keratosis is a harmless warty spot that appears during adult life as a common sign of skin aging.  Seborrheic keratoses can arise on any area of skin, covered or uncovered, with the usual exception of the palms and soles. They do not arise from mucous membranes. Seborrheic keratoses can have highly variable appearance.      Seborrheic keratoses are extremely common. It has been estimated that over 90% of adults over the age of 60 years have one or more of them. They occur in males and females of all races, typically beginning to erupt in the 30s or 40s. They are uncommon under the age of 20 years.  The precise cause of seborrhoeic keratoses is not known.  Seborrhoeic keratoses are considered degenerative in nature. As time goes by, seborrheic keratoses tend to become more numerous. Some people inherit a tendency to develop a very large number of them; some people may have hundreds of them.      There is no easy way to remove multiple lesions on a single occasion.  Unless a specific lesion is \"inflamed\" and is causing pain or stinging/burning or is bleeding, most insurance companies do not authorize treatment.      Scribe Attestation    I,:  Anna Carreon MA am acting as a scribe while in the presence of the attending physician.:       I,:  Reny Guaman MD personally performed the services described in this documentation    as scribed in my presence.:         " Dr. Rosado

## 2025-02-11 NOTE — PATIENT INSTRUCTIONS
"FRONTAL FIBROSING ALOPECIA      Assessment and Plan:  - History and clinical exam consistent with frontal fibrosing alopecia.   - Educated patient that the underlying cause of frontal fibrosing alopecia is unknown.   - Educated that frontal fibrosing alopecia is a type of scarring hair loss and that any hair lost will unfortunately not grow back.   - Educated patient that the primary goal of interventions is to prevent further hair loss and hair line recession  - Discussed therapeutic ladder, including minoxidil, low level laser therapy, spironolactone, finasteride, PRP, and hair transplantation      After an in depth discussion of the possible treatments including risks and benefits, patient elected to proceed as below:     Patient does not wish to proceed with any treatments at this time    MELANOCYTIC NEVI (\"Moles\")      Assessment and Plan:  Based on a thorough discussion of this condition and the management approach to it (including a comprehensive discussion of the known risks, side effects and potential benefits of treatment), the patient (family) agrees to implement the following specific plan:  When outside we recommend using a wide brim hat, sunglasses, long sleeve and pants, sunscreen with SPF 30+ with reapplication every 2 hours, or SPF specific clothing   Benign, reassured  Annual skin check     Melanocytic Nevi  Melanocytic nevi (\"moles\") are tan or brown, raised or flat areas of the skin which have an increased number of melanocytes. Melanocytes are the cells in our body which make pigment and account for skin color.    Some moles are present at birth (I.e., \"congenital nevi\"), while others come up later in life (i.e., \"acquired nevi\").  The sun can stimulate the body to make more moles.  Sunburns are not the only thing that triggers more moles.  Chronic sun exposure can do it too.     Clinically distinguishing a healthy mole from melanoma may be difficult, even for experienced dermatologists. The " "\"ABCDE's\" of moles have been suggested as a means of helping to alert a person to a suspicious mole and the possible increased risk of melanoma.  The suggestions for raising alert are as follows:    Asymmetry: Healthy moles tend to be symmetric, while melanomas are often asymmetric.  Asymmetry means if you draw a line through the mole, the two halves do not match in color, size, shape, or surface texture. Asymmetry can be a result of rapid enlargement of a mole, the development of a raised area on a previously flat lesion, scaling, ulceration, bleeding or scabbing within the mole.  Any mole that starts to demonstrate \"asymmetry\" should be examined promptly by a board certified dermatologist.     Border: Healthy moles tend to have discrete, even borders.  The border of a melanoma often blends into the normal skin and does not sharply delineate the mole from normal skin.  Any mole that starts to demonstrate \"uneven borders\" should be examined promptly by a board certified dermatologist.     Color: Healthy moles tend to be one color throughout.  Melanomas tend to be made up of different colors ranging from dark black, blue, white, or red.  Any mole that demonstrates a color change should be examined promptly by a board certified dermatologist.     Diameter: Healthy moles tend to be smaller than 0.6 cm in size; an exception are \"congenital nevi\" that can be larger.  Melanomas tend to grow and can often be greater than 0.6 cm (1/4 of an inch, or the size of a pencil eraser). This is only a guideline, and many normal moles may be larger than 0.6 cm without being unhealthy.  Any mole that starts to change in size (small to bigger or bigger to smaller) should be examined promptly by a board certified dermatologist.     Evolving: Healthy moles tend to \"stay the same.\"  Melanomas may often show signs of change or evolution such as a change in size, shape, color, or elevation.  Any mole that starts to itch, bleed, crust, burn, " hurt, or ulcerate or demonstrate a change or evolution should be examined promptly by a board certified dermatologist.      LENTIGO    Assessment and Plan:  Based on a thorough discussion of this condition and the management approach to it (including a comprehensive discussion of the known risks, side effects and potential benefits of treatment), the patient (family) agrees to implement the following specific plan:  When outside we recommend using a wide brim hat, sunglasses, long sleeve and pants, sunscreen with SPF 30+ with reapplication every 2 hours, or SPF specific clothing     What is a lentigo?  A lentigo is a pigmented flat or slightly raised lesion with a clearly defined edge. Unlike an ephelis (freckle), it does not fade in the winter months. There are several kinds of lentigo.  The name lentigo originally referred to its appearance resembling a small lentil. The plural of lentigo is lentigines, although “lentigos” is also in common use.    Who gets lentigines?  Lentigines can affect males and females of all ages and races. Solar lentigines are especially prevalent in fair skinned adults. Lentigines associated with syndromes are present at birth or arise during childhood.    What causes lentigines?  Common forms of lentigo are due to exposure to ultraviolet radiation:  Sun damage including sunburn   Indoor tanning   Phototherapy, especially photochemotherapy (PUVA)    Ionizing radiation, eg radiation therapy, can also cause lentigines.  Several familial syndromes associated with widespread lentigines originate from mutations in Adarsh-MAP kinase, mTOR signaling and PTEN pathways.    What is the treatment for lentigines?  Most lentigines are left alone. Attempts to lighten them may not be successful. The following approaches are used:  SPF 50+ broad-spectrum sunscreen   Hydroquinone bleaching cream   Alpha hydroxy acids   Vitamin C   Retinoids   Azelaic acid   Chemical peels  Individual lesions can be  "permanently removed using:  Cryotherapy   Intense pulsed light   Pigment lasers    How can lentigines be prevented?  Lentigines associated with exposure ultraviolet radiation can be prevented by very careful sun protection. Clothing is more successful at preventing new lentigines than are sunscreens.    What is the outlook for lentigines?  Lentigines usually persist. They may increase in number with age and sun exposure. Some in sun-protected sites may fade and disappear.    HARLEY ANGIOMAS    Assessment and Plan:  Based on a thorough discussion of this condition and the management approach to it (including a comprehensive discussion of the known risks, side effects and potential benefits of treatment), the patient (family) agrees to implement the following specific plan:  Monitor for changes  Benign, reassured    Assessment and Plan:    Cherry angioma, also known as Glass de Medhat spots, are benign vascular skin lesions. A \"cherry angioma\" is a firm red, blue or purple papule, 0.1-1 cm in diameter. When thrombosed, they can appear black in colour until evaluated with a dermatoscope when the red or purple colour is more easily seen. Cherry angioma may develop on any part of the body but most often appear on the scalp, face, lips and trunk.  An angioma is due to proliferating endothelial cells; these are the cells that line the inside of a blood vessel.    Angiomas can arise in early life or later in life; the most common type of angioma is a cherry angioma.  Cherry angiomas are very common in males and females of any age or race. They are more noticeable in white skin than in skin of colour. They markedly increase in number from about the age of 40. There may be a family history of similar lesions. Eruptive cherry angiomas have been rarely reported to be associated with internal malignancy. The cause of angiomas is unknown. Genetic analysis of cherry angiomas has shown that they frequently carry specific somatic " "missense mutations in the GNAQ and GNA11 (Q209H) genes, which are involved in other vascular and melanocytic proliferations.      SEBORRHEIC KERATOSIS; NON-INFLAMED    Assessment and Plan:  Based on a thorough discussion of this condition and the management approach to it (including a comprehensive discussion of the known risks, side effects and potential benefits of treatment), the patient (family) agrees to implement the following specific plan:  Monitor for changes  Benign, reassured    Seborrheic Keratosis  A seborrheic keratosis is a harmless warty spot that appears during adult life as a common sign of skin aging.  Seborrheic keratoses can arise on any area of skin, covered or uncovered, with the usual exception of the palms and soles. They do not arise from mucous membranes. Seborrheic keratoses can have highly variable appearance.      Seborrheic keratoses are extremely common. It has been estimated that over 90% of adults over the age of 60 years have one or more of them. They occur in males and females of all races, typically beginning to erupt in the 30s or 40s. They are uncommon under the age of 20 years.  The precise cause of seborrhoeic keratoses is not known.  Seborrhoeic keratoses are considered degenerative in nature. As time goes by, seborrheic keratoses tend to become more numerous. Some people inherit a tendency to develop a very large number of them; some people may have hundreds of them.      There is no easy way to remove multiple lesions on a single occasion.  Unless a specific lesion is \"inflamed\" and is causing pain or stinging/burning or is bleeding, most insurance companies do not authorize treatment.    "

## 2025-02-12 ENCOUNTER — PATIENT MESSAGE (OUTPATIENT)
Age: 70
End: 2025-02-12

## 2025-02-17 ENCOUNTER — DOCUMENTATION (OUTPATIENT)
Age: 70
End: 2025-02-17

## 2025-02-17 DIAGNOSIS — L65.9 ALOPECIA: Primary | ICD-10-CM

## 2025-02-17 RX ORDER — DOXYCYCLINE HYCLATE 20 MG
20 TABLET ORAL 2 TIMES DAILY
Qty: 180 TABLET | Refills: 1 | Status: SHIPPED | OUTPATIENT
Start: 2025-02-17 | End: 2025-08-16

## 2025-02-17 RX ORDER — CLOBETASOL PROPIONATE 0.5 MG/ML
SOLUTION TOPICAL 2 TIMES DAILY
Qty: 50 ML | Refills: 2 | Status: SHIPPED | OUTPATIENT
Start: 2025-02-17

## 2025-02-17 NOTE — PROGRESS NOTES
Add a new reason  Add  frontal fibrosing alopecia  frontal fibrosing alopecia 02/17/2025 Patient called after visit.  Changed mind about treating hair loss.  Presumed frontal fibrosing alopecia. No biopsy.  Based on clinical findings.  Sent in script for clobetasol solution and doxycycline 20 BID.  Recommend re-evaluate in 6 months

## 2025-03-05 ENCOUNTER — OFFICE VISIT (OUTPATIENT)
Age: 70
End: 2025-03-05

## 2025-03-05 VITALS
HEART RATE: 73 BPM | DIASTOLIC BLOOD PRESSURE: 68 MMHG | SYSTOLIC BLOOD PRESSURE: 106 MMHG | TEMPERATURE: 98.3 F | OXYGEN SATURATION: 97 % | WEIGHT: 130.5 LBS | HEIGHT: 67 IN | BODY MASS INDEX: 20.48 KG/M2

## 2025-03-05 DIAGNOSIS — Z23 ENCOUNTER FOR IMMUNIZATION: Primary | ICD-10-CM

## 2025-03-05 DIAGNOSIS — Z00.00 MEDICARE ANNUAL WELLNESS VISIT, SUBSEQUENT: ICD-10-CM

## 2025-03-05 PROCEDURE — 90471 IMMUNIZATION ADMIN: CPT | Performed by: FAMILY MEDICINE

## 2025-03-05 PROCEDURE — G0438 PPPS, INITIAL VISIT: HCPCS | Performed by: FAMILY MEDICINE

## 2025-03-05 PROCEDURE — 90678 RSV VACC PREF BIVALENT IM: CPT | Performed by: FAMILY MEDICINE

## 2025-03-05 NOTE — PROGRESS NOTES
Name: Yamilka Mcdonald      : 1955      MRN: [id number]  Encounter Provider: Jameel Draper DO  Encounter Date: 3/5/2025   Encounter department: [facility]    Assessment & Plan       Preventive health issues were discussed with patient, and age appropriate screening tests were ordered as noted in patient's After Visit Summary. Personalized health advice and appropriate referrals for health education or preventive services given if needed, as noted in patient's After Visit Summary.    History of Present Illness     HPI   Patient Care Team:  Jameel Draper DO as PCP - General (Family Medicine)  MD Nicole Lopez MD (Endocrinology)  MALVIN Brunner (Nurse Practitioner)    Review of Systems  Medical History Reviewed by provider this encounter:       Annual Wellness Visit Questionnaire       Health Risk Assessment:   Patient rates overall health as good. Patient feels that their physical health rating is same. Patient is satisfied with their life. Eyesight was rated as slightly worse. Hearing was rated as same. Patient feels that their emotional and mental health rating is same. Patients states they are never, rarely angry. Patient states they are sometimes unusually tired/fatigued. Pain experienced in the last 7 days has been none. Patient states that she has experienced no weight loss or gain in last 6 months.     Fall Risk Screening:   In the past year, patient has experienced: no history of falling in past year      Urinary Incontinence Screening:   Patient has not leaked urine accidently in the last six months.     Home Safety:  Patient does not have trouble with stairs inside or outside of their home. Patient has working smoke alarms and has no working carbon monoxide detector. Home safety hazards include: none.     Nutrition:   Current diet is Regular.     Medications:   Patient is not currently taking any over-the-counter supplements. Patient is able to  manage medications.     Activities of Daily Living (ADLs)/Instrumental Activities of Daily Living (IADLs):   Walk and transfer into and out of bed and chair?: Yes  Dress and groom yourself?: Yes    Bathe or shower yourself?: Yes    Feed yourself? Yes  Do your laundry/housekeeping?: Yes  Manage your money, pay your bills and track your expenses?: Yes  Make your own meals?: Yes    Do your own shopping?: Yes    Previous Hospitalizations:   Any hospitalizations or ED visits within the last 12 months?: No      Advance Care Planning:   Living will: Yes    Durable POA for healthcare: Yes    Advanced directive: Yes      PREVENTIVE SCREENINGS      Cardiovascular Screening:    General: Screening Current      Colorectal Cancer Screening:     General: Screening Current      Breast Cancer Screening:     General: Screening Current      Cervical Cancer Screening:    General: Screening Not Indicated      Lung Cancer Screening:     General: Screening Not Indicated      Hepatitis C Screening:    General: Screening Current    Screening, Brief Intervention, and Referral to Treatment (SBIRT)     Screening  Typical number of drinks in a day: 0  Typical number of drinks in a week: 1  Interpretation: Low risk drinking behavior.    AUDIT-C Screenin) How often did you have a drink containing alcohol in the past year? 2 to 4 times a month  2) How many drinks did you have on a typical day when you were drinking in the past year? 1 to 2  3) How often did you have 6 or more drinks on one occasion in the past year? never    AUDIT-C Score: 2  Interpretation: Score 0-2 (female): Negative screen for alcohol misuse    Single Item Drug Screening:  How often have you used an illegal drug (including marijuana) or a prescription medication for non-medical reasons in the past year? never    Single Item Drug Screen Score: 0  Interpretation: Negative screen for possible drug use disorder    Social Drivers of Health     Financial Resource Strain: Low  Risk  (2/28/2025)    Overall Financial Resource Strain (CARDIA)     Difficulty of Paying Living Expenses: Not very hard   Food Insecurity: No Food Insecurity (2/28/2025)    Hunger Vital Sign     Worried About Running Out of Food in the Last Year: Never true     Ran Out of Food in the Last Year: Never true   Transportation Needs: No Transportation Needs (2/28/2025)    PRAPARE - Transportation     Lack of Transportation (Medical): No     Lack of Transportation (Non-Medical): No   Housing Stability: Low Risk  (2/28/2025)    Housing Stability Vital Sign     Unable to Pay for Housing in the Last Year: No     Number of Times Moved in the Last Year: 0     Homeless in the Last Year: No   Utilities: Not At Risk (2/28/2025)    [facility] Utilities     Threatened with loss of utilities: No     No results found.    Objective   There were no vitals taken for this visit.    Physical Exam

## 2025-03-06 NOTE — PROGRESS NOTES
"Name: Yamilka Mcdonald      : 1955      MRN: [id number]  Encounter Provider: Jameel Draper DO  Encounter Date: 3/5/2025   Encounter department: [facility]  :  Assessment & Plan  Encounter for immunization  The patient did receive an RSV vaccination today.  Orders:    Respiratory Syncytial Virus (RSV) vaccine (recombinant) (Abrysvo)    Medicare annual wellness visit, subsequent  This is a yearly Medicare wellness exam for this 69-year-old female.  She denies any problems with depression, falls, or urinary incontinence.  After today she is up-to-date with all her immunizations.  She is also up-to-date with colon cancer and breast cancer screening and osteoporosis screening.  Yearly Medicare wellness exam is recommended.              History of Present Illness   HPI  Review of Systems    Objective   /68 (BP Location: Left arm, Patient Position: Sitting, Cuff Size: Standard)   Pulse 73   Temp 98.3 °F (36.8 °C) (Tympanic)   Ht 5' 7\" (1.702 m)   Wt 59.2 kg (130 lb 8 oz)   SpO2 97%   BMI 20.44 kg/m²      Physical Exam    Answers submitted by the patient for this visit:  Medicare Annual Wellness Visit (Submitted on 2025)  How would you rate your overall health?: good  Compared to last year, how is your physical health?: same  In general, how satisfied are you with your life?: satisfied  Compared to last year, how is your eyesight?: slightly worse  Compared to last year, how is your hearing?: same  Compared to last year, how is your emotional/mental health?: same  How often is anger a problem for you?: never, rarely  How often do you feel unusually tired/fatigued?: sometimes  In the past 7 days, how much pain have you experienced?: none  In the past 6 months, have you lost or gained 10 pounds without trying?: No  One or more falls in the last year: No  In the past 6 months, have you accidentally leaked urine?: No  Do you have trouble with the stairs inside or " outside your home?: No  Does your home have working smoke alarms?: Yes  Does your home have a carbon monoxide monitor?: No  Which safety hazards (if any) have you experienced in your home? Please select all that apply.: none  How would you describe your current diet? Please select all that apply.: Regular  In addition to prescription medications, are you taking any over-the-counter supplements?: No  Can you manage your medications?: Yes  Are you currently taking any opioid medications?: No  Can you walk and transfer into and out of your bed and chair?: Yes  Can you dress and groom yourself?: Yes  Can you bathe or shower yourself?: Yes  Can you feed yourself?: Yes  Can you do your laundry/ housekeeping?: Yes  Can you manage your money, pay your bills, and track your expenses?: Yes  Can you make your own meals?: Yes  Can you do your own shopping?: Yes  Within the last 12 months, have you had any hospitalizations or Emergency Department visits?: No  Do you have a living will?: Yes  Do you have a Durable POA (Power of ) for healthcare decisions?: Yes  Do you have an Advanced Directive for end of life decisions?: Yes  How often have you used an illegal drug (including marijuana) or a prescription medication for non-medical reasons in the past year?: never  What is the typical number of drinks you consume in a day?: 0  What is the typical number of drinks you consume in a week?: 1  How often did you have a drink containing alcohol in the past year?: 2 to 4 times a month  How many drinks did you have on a typical day  when you were drinking in the past year?: 1 to 2  How often did you have 6 or more drinks on one occasion in the past year?: never

## 2025-03-06 NOTE — PATIENT INSTRUCTIONS
Medicare Preventive Visit Patient Instructions  Thank you for completing your Welcome to Medicare Visit or Medicare Annual Wellness Visit today. Your next wellness visit will be due in one year (3/7/2026).  The screening/preventive services that you may require over the next 5-10 years are detailed below. Some tests may not apply to you based off risk factors and/or age. Screening tests ordered at today's visit but not completed yet may show as past due. Also, please note that scanned in results may not display below.  Preventive Screenings:  Service Recommendations Previous Testing/Comments   Colorectal Cancer Screening  * Colonoscopy    * Fecal Occult Blood Test (FOBT)/Fecal Immunochemical Test (FIT)  * Fecal DNA/Cologuard Test  * Flexible Sigmoidoscopy Age: 45-75 years old   Colonoscopy: every 10 years (may be performed more frequently if at higher risk)  OR  FOBT/FIT: every 1 year  OR  Cologuard: every 3 years  OR  Sigmoidoscopy: every 5 years  Screening may be recommended earlier than age 45 if at higher risk for colorectal cancer. Also, an individualized decision between you and your healthcare provider will decide whether screening between the ages of 76-85 would be appropriate. Colonoscopy: 06/01/2015  FOBT/FIT: Not on file  Cologuard: Not on file  Sigmoidoscopy: Not on file    Screening Current     Breast Cancer Screening Age: 40+ years old  Frequency: every 1-2 years  Not required if history of left and right mastectomy Mammogram: 06/19/2023    Screening Current   Cervical Cancer Screening Between the ages of 21-29, pap smear recommended once every 3 years.   Between the ages of 30-65, can perform pap smear with HPV co-testing every 5 years.   Recommendations may differ for women with a history of total hysterectomy, cervical cancer, or abnormal pap smears in past. Pap Smear: 12/15/2021    Screening Not Indicated   Hepatitis C Screening Once for adults born between 1945 and 1965  More frequently in  patients at high risk for Hepatitis C Hep C Antibody: 03/19/2019    Screening Current   Diabetes Screening 1-2 times per year if you're at risk for diabetes or have pre-diabetes Fasting glucose: 106 mg/dL (3/8/2023)  A1C: 5.9 % (8/25/2020)      Cholesterol Screening Once every 5 years if you don't have a lipid disorder. May order more often based on risk factors. Lipid panel: 03/08/2023    Screening Current     Other Preventive Screenings Covered by Medicare:  Abdominal Aortic Aneurysm (AAA) Screening: covered once if your at risk. You're considered to be at risk if you have a family history of AAA.  Lung Cancer Screening: covers low dose CT scan once per year if you meet all of the following conditions: (1) Age 55-77; (2) No signs or symptoms of lung cancer; (3) Current smoker or have quit smoking within the last 15 years; (4) You have a tobacco smoking history of at least 20 pack years (packs per day multiplied by number of years you smoked); (5) You get a written order from a healthcare provider.  Glaucoma Screening: covered annually if you're considered high risk: (1) You have diabetes OR (2) Family history of glaucoma OR (3)  aged 50 and older OR (4)  American aged 65 and older  Osteoporosis Screening: covered every 2 years if you meet one of the following conditions: (1) You're estrogen deficient and at risk for osteoporosis based off medical history and other findings; (2) Have a vertebral abnormality; (3) On glucocorticoid therapy for more than 3 months; (4) Have primary hyperparathyroidism; (5) On osteoporosis medications and need to assess response to drug therapy.   Last bone density test (DXA Scan): 06/19/2023.  HIV Screening: covered annually if you're between the age of 15-65. Also covered annually if you are younger than 15 and older than 65 with risk factors for HIV infection. For pregnant patients, it is covered up to 3 times per pregnancy.    Immunizations:  Immunization  Recommendations   Influenza Vaccine Annual influenza vaccination during flu season is recommended for all persons aged >= 6 months who do not have contraindications   Pneumococcal Vaccine   * Pneumococcal conjugate vaccine = PCV13 (Prevnar 13), PCV15 (Vaxneuvance), PCV20 (Prevnar 20)  * Pneumococcal polysaccharide vaccine = PPSV23 (Pneumovax) Adults 19-65 yo with certain risk factors or if 65+ yo  If never received any pneumonia vaccine: recommend Prevnar 20 (PCV20)  Give PCV20 if previously received 1 dose of PCV13 or PPSV23   Hepatitis B Vaccine 3 dose series if at intermediate or high risk (ex: diabetes, end stage renal disease, liver disease)   Respiratory syncytial virus (RSV) Vaccine - COVERED BY MEDICARE PART D  * RSVPreF3 (Arexvy) CDC recommends that adults 60 years of age and older may receive a single dose of RSV vaccine using shared clinical decision-making (SCDM)   Tetanus (Td) Vaccine - COST NOT COVERED BY MEDICARE PART B Following completion of primary series, a booster dose should be given every 10 years to maintain immunity against tetanus. Td may also be given as tetanus wound prophylaxis.   Tdap Vaccine - COST NOT COVERED BY MEDICARE PART B Recommended at least once for all adults. For pregnant patients, recommended with each pregnancy.   Shingles Vaccine (Shingrix) - COST NOT COVERED BY MEDICARE PART B  2 shot series recommended in those 19 years and older who have or will have weakened immune systems or those 50 years and older     Health Maintenance Due:      Topic Date Due    Colorectal Cancer Screening  06/01/2025    Breast Cancer Screening: Mammogram  06/19/2025    Hepatitis C Screening  Completed     Immunizations Due:      Topic Date Due    Influenza Vaccine (1) 09/01/2024    COVID-19 Vaccine (6 - 2024-25 season) 09/01/2024     Advance Directives   What are advance directives?  Advance directives are legal documents that state your wishes and plans for medical care. These plans are made  ahead of time in case you lose your ability to make decisions for yourself. Advance directives can apply to any medical decision, such as the treatments you want, and if you want to donate organs.   What are the types of advance directives?  There are many types of advance directives, and each state has rules about how to use them. You may choose a combination of any of the following:  Living will:  This is a written record of the treatment you want. You can also choose which treatments you do not want, which to limit, and which to stop at a certain time. This includes surgery, medicine, IV fluid, and tube feedings.   Durable power of  for healthcare (DPAHC):  This is a written record that states who you want to make healthcare choices for you when you are unable to make them for yourself. This person, called a proxy, is usually a family member or a friend. You may choose more than 1 proxy.  Do not resuscitate (DNR) order:  A DNR order is used in case your heart stops beating or you stop breathing. It is a request not to have certain forms of treatment, such as CPR. A DNR order may be included in other types of advance directives.  Medical directive:  This covers the care that you want if you are in a coma, near death, or unable to make decisions for yourself. You can list the treatments you want for each condition. Treatment may include pain medicine, surgery, blood transfusions, dialysis, IV or tube feedings, and a ventilator (breathing machine).  Values history:  This document has questions about your views, beliefs, and how you feel and think about life. This information can help others choose the care that you would choose.  Why are advance directives important?  An advance directive helps you control your care. Although spoken wishes may be used, it is better to have your wishes written down. Spoken wishes can be misunderstood, or not followed. Treatments may be given even if you do not want them. An  advance directive may make it easier for your family to make difficult choices about your care.       © Copyright [employer] 2018 Information is for End User's use only and may not be sold, redistributed or otherwise used for commercial purposes. All illustrations and images included in CareNotes® are the copyrighted property of [employer]. or [employer]  This patient denies any problems with depression or falls.  She denies any problems with urinary incontinence.  She is up-to-date with all her immunizations.  She is up-to-date with colon cancer screening and breast cancer screening.  She is also up-to-date with DEXA scans.  The patient should continue with yearly Medicare wellness exams.

## 2025-03-25 ENCOUNTER — APPOINTMENT (OUTPATIENT)
Dept: LAB | Facility: HOSPITAL | Age: 70
End: 2025-03-25
Payer: MEDICARE

## 2025-03-25 DIAGNOSIS — E89.0 POSTOPERATIVE HYPOTHYROIDISM: ICD-10-CM

## 2025-03-25 DIAGNOSIS — Z13.220 SCREENING FOR LIPID DISORDERS: ICD-10-CM

## 2025-03-25 DIAGNOSIS — Z13.1 SCREENING FOR DIABETES MELLITUS: ICD-10-CM

## 2025-03-25 DIAGNOSIS — C73 PAPILLARY MICROCARCINOMA OF THYROID (HCC): ICD-10-CM

## 2025-03-25 DIAGNOSIS — E55.9 VITAMIN D DEFICIENCY: ICD-10-CM

## 2025-03-25 LAB
25(OH)D3 SERPL-MCNC: 34.4 NG/ML (ref 30–100)
ALBUMIN SERPL BCG-MCNC: 4.3 G/DL (ref 3.5–5)
ALP SERPL-CCNC: 80 U/L (ref 34–104)
ALT SERPL W P-5'-P-CCNC: 13 U/L (ref 7–52)
ANION GAP SERPL CALCULATED.3IONS-SCNC: 12 MMOL/L (ref 4–13)
AST SERPL W P-5'-P-CCNC: 18 U/L (ref 13–39)
BILIRUB SERPL-MCNC: 0.64 MG/DL (ref 0.2–1)
BUN SERPL-MCNC: 16 MG/DL (ref 5–25)
CALCIUM SERPL-MCNC: 9.2 MG/DL (ref 8.4–10.2)
CHLORIDE SERPL-SCNC: 102 MMOL/L (ref 96–108)
CHOLEST SERPL-MCNC: 199 MG/DL (ref ?–200)
CO2 SERPL-SCNC: 26 MMOL/L (ref 21–32)
CREAT SERPL-MCNC: 0.68 MG/DL (ref 0.6–1.3)
GFR SERPL CREATININE-BSD FRML MDRD: 90 ML/MIN/1.73SQ M
GLUCOSE P FAST SERPL-MCNC: 93 MG/DL (ref 65–99)
HDLC SERPL-MCNC: 71 MG/DL
LDLC SERPL CALC-MCNC: 112 MG/DL (ref 0–100)
NONHDLC SERPL-MCNC: 128 MG/DL
POTASSIUM SERPL-SCNC: 4.3 MMOL/L (ref 3.5–5.3)
PROT SERPL-MCNC: 7.5 G/DL (ref 6.4–8.4)
SODIUM SERPL-SCNC: 140 MMOL/L (ref 135–147)
T4 FREE SERPL-MCNC: 1 NG/DL (ref 0.61–1.12)
TRIGL SERPL-MCNC: 81 MG/DL (ref ?–150)
TSH SERPL DL<=0.05 MIU/L-ACNC: 1.37 UIU/ML (ref 0.45–4.5)

## 2025-03-25 PROCEDURE — 86800 THYROGLOBULIN ANTIBODY: CPT

## 2025-03-25 PROCEDURE — 80061 LIPID PANEL: CPT

## 2025-03-25 PROCEDURE — 84439 ASSAY OF FREE THYROXINE: CPT

## 2025-03-25 PROCEDURE — 82306 VITAMIN D 25 HYDROXY: CPT

## 2025-03-25 PROCEDURE — 83036 HEMOGLOBIN GLYCOSYLATED A1C: CPT

## 2025-03-25 PROCEDURE — 36415 COLL VENOUS BLD VENIPUNCTURE: CPT

## 2025-03-25 PROCEDURE — 84432 ASSAY OF THYROGLOBULIN: CPT

## 2025-03-25 PROCEDURE — 80053 COMPREHEN METABOLIC PANEL: CPT

## 2025-03-25 PROCEDURE — 84443 ASSAY THYROID STIM HORMONE: CPT

## 2025-03-26 ENCOUNTER — RESULTS FOLLOW-UP (OUTPATIENT)
Dept: ENDOCRINOLOGY | Facility: CLINIC | Age: 70
End: 2025-03-26

## 2025-03-26 LAB
EST. AVERAGE GLUCOSE BLD GHB EST-MCNC: 128 MG/DL
HBA1C MFR BLD: 6.1 %
THYROGLOB AB SERPL-ACNC: <1 IU/ML (ref 0–0.9)
THYROGLOB SERPL-MCNC: 8.9 NG/ML (ref 1.5–38.5)

## 2025-03-31 DIAGNOSIS — E55.9 VITAMIN D DEFICIENCY: ICD-10-CM

## 2025-03-31 DIAGNOSIS — Z90.09 HISTORY OF LOBECTOMY OF THYROID: ICD-10-CM

## 2025-03-31 DIAGNOSIS — C73 PAPILLARY MICROCARCINOMA OF THYROID (HCC): ICD-10-CM

## 2025-04-01 RX ORDER — LEVOTHYROXINE SODIUM 75 UG/1
75 TABLET ORAL DAILY
Qty: 90 TABLET | Refills: 1 | Status: SHIPPED | OUTPATIENT
Start: 2025-04-01

## 2025-05-14 ENCOUNTER — TELEPHONE (OUTPATIENT)
Dept: GASTROENTEROLOGY | Facility: CLINIC | Age: 70
End: 2025-05-14

## 2025-05-14 DIAGNOSIS — Z86.0100 HX OF COLONIC POLYPS: Primary | ICD-10-CM

## 2025-05-14 NOTE — TELEPHONE ENCOUNTER
Pt is due for a colonoscopy with Dr Jefferson for hx of colonic polyp.       05/14/25  Screened by: Alvina Childers MA    Referring Provider Dr Jefferson    Pre- Screening:     There is no height or weight on file to calculate BMI.  Has patient been referred for a routine screening Colonoscopy? yes  Is the patient between 45-75 years old? yes      Previous Colonoscopy yes   If yes:    Date: recall     Facility:     Reason:       SCHEDULING STAFF: If the patient is between 45yrs-49yrs, please advise patient to confirm benefits/coverage with their insurance company for a routine screening colonoscopy, some insurance carriers will only cover at 50yrs or older. If the patient is over 75years old, please schedule an office visit.     Does the patient want to see a Gastroenterologist prior to their procedure OR are they having any GI symptoms? no    Has the patient been hospitalized or had abdominal surgery in the past 6 months? no    Does the patient use supplemental oxygen? no    Does the patient take Coumadin, Lovenox, Plavix, Elliquis, Xarelto, or other blood thinning medication? no    Has the patient had a stroke, cardiac event, or stent placed in the past year? no    SCHEDULING STAFF: If patient answers NO to above questions, then schedule procedure. If patient answers YES to above questions, then schedule office appointment.     If patient is between 45yrs - 49yrs, please advise patient that we will have to confirm benefits & coverage with their insurance company for a routine screening colonoscopy.

## 2025-05-14 NOTE — TELEPHONE ENCOUNTER
Scheduled date of colonoscopy (as of today): 7/8/25  Physician performing colonoscopy: Dr Jefferson  Location of colonoscopy:  [facility]  Bowel prep reviewed with patient: golytely mailed   Instructions reviewed with patient by: mark  Clearances: n/a

## 2025-06-19 ENCOUNTER — TELEPHONE (OUTPATIENT)
Dept: GASTROENTEROLOGY | Facility: CLINIC | Age: 70
End: 2025-06-19

## 2025-06-19 NOTE — TELEPHONE ENCOUNTER
Pt was scheduled on 7/8/25 with Dr Jefferson, however, needs to be rescheduled due to a schedule change.  Called and spoke to pt and rescheduled to 7/22/25.

## 2025-06-23 DIAGNOSIS — Z90.09 HISTORY OF LOBECTOMY OF THYROID: ICD-10-CM

## 2025-06-23 DIAGNOSIS — E55.9 VITAMIN D DEFICIENCY: ICD-10-CM

## 2025-06-23 DIAGNOSIS — C73 PAPILLARY MICROCARCINOMA OF THYROID (HCC): ICD-10-CM

## 2025-06-24 RX ORDER — LEVOTHYROXINE SODIUM 75 UG/1
75 TABLET ORAL DAILY
Qty: 90 TABLET | Refills: 1 | Status: SHIPPED | OUTPATIENT
Start: 2025-06-24

## 2025-07-21 RX ORDER — LIDOCAINE HYDROCHLORIDE 10 MG/ML
0.5 INJECTION, SOLUTION EPIDURAL; INFILTRATION; INTRACAUDAL; PERINEURAL ONCE AS NEEDED
Status: CANCELLED | OUTPATIENT
Start: 2025-07-21

## 2025-07-21 RX ORDER — SODIUM CHLORIDE, SODIUM LACTATE, POTASSIUM CHLORIDE, CALCIUM CHLORIDE 600; 310; 30; 20 MG/100ML; MG/100ML; MG/100ML; MG/100ML
20 INJECTION, SOLUTION INTRAVENOUS CONTINUOUS
Status: CANCELLED | OUTPATIENT
Start: 2025-07-21

## 2025-07-22 ENCOUNTER — HOSPITAL ENCOUNTER (OUTPATIENT)
Dept: GASTROENTEROLOGY | Facility: AMBULARY SURGERY CENTER | Age: 70
Setting detail: OUTPATIENT SURGERY
Discharge: HOME | End: 2025-07-22
Attending: INTERNAL MEDICINE
Payer: MEDICARE

## 2025-07-22 ENCOUNTER — ANESTHESIA (OUTPATIENT)
Dept: GASTROENTEROLOGY | Facility: AMBULARY SURGERY CENTER | Age: 70
End: 2025-07-22
Payer: MEDICARE

## 2025-07-22 VITALS
HEART RATE: 67 BPM | DIASTOLIC BLOOD PRESSURE: 63 MMHG | OXYGEN SATURATION: 98 % | RESPIRATION RATE: 16 BRPM | SYSTOLIC BLOOD PRESSURE: 99 MMHG | TEMPERATURE: 96.6 F

## 2025-07-22 DIAGNOSIS — Z86.0100 HX OF COLONIC POLYPS: ICD-10-CM

## 2025-07-22 PROCEDURE — 88305 TISSUE EXAM BY PATHOLOGIST: CPT | Performed by: PATHOLOGY

## 2025-07-22 PROCEDURE — 45385 COLONOSCOPY W/LESION REMOVAL: CPT | Performed by: INTERNAL MEDICINE

## 2025-07-22 RX ORDER — SODIUM CHLORIDE, SODIUM LACTATE, POTASSIUM CHLORIDE, CALCIUM CHLORIDE 600; 310; 30; 20 MG/100ML; MG/100ML; MG/100ML; MG/100ML
20 INJECTION, SOLUTION INTRAVENOUS CONTINUOUS
Status: DISCONTINUED | OUTPATIENT
Start: 2025-07-22 | End: 2025-07-26 | Stop reason: HOSPADM

## 2025-07-22 RX ORDER — PROPOFOL 10 MG/ML
INJECTION, EMULSION INTRAVENOUS AS NEEDED
Status: DISCONTINUED | OUTPATIENT
Start: 2025-07-22 | End: 2025-07-22

## 2025-07-22 RX ORDER — LIDOCAINE HYDROCHLORIDE 10 MG/ML
INJECTION, SOLUTION EPIDURAL; INFILTRATION; INTRACAUDAL; PERINEURAL AS NEEDED
Status: DISCONTINUED | OUTPATIENT
Start: 2025-07-22 | End: 2025-07-22

## 2025-07-22 RX ORDER — LIDOCAINE HYDROCHLORIDE 10 MG/ML
0.5 INJECTION, SOLUTION EPIDURAL; INFILTRATION; INTRACAUDAL; PERINEURAL ONCE AS NEEDED
Status: DISCONTINUED | OUTPATIENT
Start: 2025-07-22 | End: 2025-07-26 | Stop reason: HOSPADM

## 2025-07-22 RX ORDER — PROPOFOL 10 MG/ML
INJECTION, EMULSION INTRAVENOUS CONTINUOUS PRN
Status: DISCONTINUED | OUTPATIENT
Start: 2025-07-22 | End: 2025-07-22

## 2025-07-22 RX ORDER — SODIUM CHLORIDE, SODIUM LACTATE, POTASSIUM CHLORIDE, CALCIUM CHLORIDE 600; 310; 30; 20 MG/100ML; MG/100ML; MG/100ML; MG/100ML
INJECTION, SOLUTION INTRAVENOUS CONTINUOUS PRN
Status: DISCONTINUED | OUTPATIENT
Start: 2025-07-22 | End: 2025-07-22

## 2025-07-22 RX ADMIN — SODIUM CHLORIDE, SODIUM LACTATE, POTASSIUM CHLORIDE, AND CALCIUM CHLORIDE: .6; .31; .03; .02 INJECTION, SOLUTION INTRAVENOUS at 09:32

## 2025-07-22 RX ADMIN — PROPOFOL 100 MCG/KG/MIN: 10 INJECTION, EMULSION INTRAVENOUS at 09:36

## 2025-07-22 RX ADMIN — PROPOFOL 70 MG: 10 INJECTION, EMULSION INTRAVENOUS at 09:36

## 2025-07-22 RX ADMIN — LIDOCAINE HYDROCHLORIDE 5 ML: 10 INJECTION, SOLUTION EPIDURAL; INFILTRATION; INTRACAUDAL; PERINEURAL at 09:36

## 2025-07-22 RX ADMIN — PROPOFOL 30 MG: 10 INJECTION, EMULSION INTRAVENOUS at 09:40

## 2025-07-22 NOTE — ANESTHESIA POSTPROCEDURE EVALUATION
Post-Op Assessment Note    CV Status:  Stable         Mental Status:  Alert   PONV Controlled:  Controlled   Airway Patency:  Patent     Post Op Vitals Reviewed: Yes    No anethesia notable event occurred.    Staff: CRNA           Last Filed PACU Vitals:  Vitals Value Taken Time   Temp     Pulse 75    /63    Resp 20    SpO2 97

## 2025-07-22 NOTE — H&P
History and Physical - [facility]  Yamilka Mcdonald 69 y.o. female MRN: [id number]                  HPI: Yamilka Mcdonald is a 69 y.o. year old female who presents for screening colonoscopy      REVIEW OF SYSTEMS: Per the HPI, and otherwise unremarkable.    Historical Information   Past Medical History[1]  Past Surgical History[2]  Social History   Social History     Substance and Sexual Activity   Alcohol Use Not Currently    Alcohol/week: 1.0 standard drink of alcohol    Types: 1 Glasses of wine per week    Comment: occasional alcohol use      Social History     Substance and Sexual Activity   Drug Use No     Tobacco Use History[3]  Family History[4]    Meds/Allergies     Current Medications[5]    Allergies[6]    Objective     /65   Pulse 88   Temp (!) 96.6 °F (35.9 °C) (Temporal)   Resp 16   SpO2 98%       PHYSICAL EXAM    Gen: NAD  Head: NCAT  CV: RRR  CHEST: Clear  ABD: soft, NT/ND  EXT: no edema      ASSESSMENT/PLAN:  This is a 69 y.o. year old female here for colonoscopy, and she is stable and optimized for her procedure.             [1]   Past Medical History:  Diagnosis Date    Dizziness     Headache(784.0) years    migraines    Migraine    [2]   Past Surgical History:  Procedure Laterality Date    BREAST CYST EXCISION Bilateral     benign    COLONOSCOPY      HEMORRHOID SURGERY      1995    HYSTERECTOMY      supracervical / 1998    INCISIONAL BREAST BIOPSY      x2 1996    CA PRTL THYROID LOBECTOMY UNI W/WO ISTHMUSECTOMY Left 05/10/2023    Procedure: LOBECTOMY THYROID;  Surgeon: Shawn Cosme MD;  Location: [facility] OR;  Service: General    SHOULDER SURGERY      bone spur 1999    THYROID SURGERY  May 2023    US GUIDED THYROID BIOPSY  04/18/2023   [3]   Social History  Tobacco Use   Smoking Status Never    Passive exposure: Past   Smokeless Tobacco Never   [4]   Family History  Problem Relation Name Age of Onset    Uterine cancer Mother YOSEPH THOMAS     Cancer Mother YOSEPH THOMAS      Heart disease Father Renaldo arndt         cardiac disorder     Diabetes Father Renaldo Marsh  father     Diabetes unspecified Father Renaldo Marsh  father     Other Sister          hip arthrosis     Uterine cancer Maternal Aunt      Uterine cancer Maternal Aunt      Cancer Paternal Aunt Bonnie Peterson     No Known Problems Paternal Aunt      No Known Problems Paternal Aunt      No Known Problems Paternal Aunt      Cancer Paternal Aunt     [5]   Current Outpatient Medications:     doxycycline (PERIOSTAT) 20 MG tablet    levothyroxine 75 mcg tablet    Multiple Vitamins-Minerals (CENTRUM SILVER 50+WOMEN PO)    clobetasol (TEMOVATE) 0.05 % external solution    polyethylene glycol (GOLYTELY) 4000 mL solution    Current Facility-Administered Medications:     lactated ringers infusion, 20 mL/hr, Intravenous, Continuous    lidocaine (PF) (XYLOCAINE-MPF) 1 % injection 0.5 mL, 0.5 mL, Infiltration, Once PRN  [6] No Known Allergies

## 2025-07-22 NOTE — ANESTHESIA PREPROCEDURE EVALUATION
Procedure:  COLONOSCOPY    Relevant Problems   ENDO   (+) Postoperative hypothyroidism      GYN   (+) History of hysterectomy      Cormack I with Costello 3 on 5/10/23    Denies recent fever, cough or other symptom of upper respiratory tract infection.    Confirmed NPO appropriate    Physical Exam    Airway     Mallampati score: II  TM Distance: >3 FB  Neck ROM: full  Mouth opening: >= 4 cm      Cardiovascular      Dental   No notable dental hx     Pulmonary      Neurological    She appears awake, alert and oriented x3.      Other Findings  post-pubertal.      Anesthesia Plan  ASA Score- 2     Anesthesia Type- IV sedation with anesthesia with ASA Monitors.         Additional Monitors:     Airway Plan: natural airway.           Plan Factors-    Chart reviewed.   Existing labs reviewed.     Patient is not a current smoker.  Patient did not smoke on day of surgery.            Induction- intravenous.    Postoperative Plan- .   Monitoring Plan - Monitoring plan - standard ASA monitoring  Post Operative Pain Plan - non-opiod analgesics        Informed Consent- Anesthetic plan and risks discussed with patient.        NPO Status:  Vitals Value Taken Time   Date of last liquid 07/22/25 07/22/25 08:54   Time of last liquid 0415 07/22/25 08:54   Date of last solid 07/20/25 07/22/25 08:54   Time of last solid 1900 07/22/25 08:54

## 2025-07-28 PROCEDURE — 88305 TISSUE EXAM BY PATHOLOGIST: CPT | Performed by: PATHOLOGY

## 2025-08-12 ENCOUNTER — OFFICE VISIT (OUTPATIENT)
Age: 70
End: 2025-08-12
Payer: MEDICARE

## (undated) DEVICE — SUT SILK 2-0 30 IN SA85H

## (undated) DEVICE — DRAPE UTILITY

## (undated) DEVICE — CHLORAPREP HI-LITE 26ML ORANGE

## (undated) DEVICE — HARMONIC FOCUS SHEARS 9CM LENGTH + ADAPTIVE TISSUE TECHNOLOGY FOR USE WITH BLUE HAND PIECE ONLY: Brand: HARMONIC FOCUS

## (undated) DEVICE — LIGACLIP APPLIER SMALL

## (undated) DEVICE — SUT VICRYL 3-0 SH 27 IN J416H

## (undated) DEVICE — INTENDED FOR TISSUE SEPARATION, AND OTHER PROCEDURES THAT REQUIRE A SHARP SURGICAL BLADE TO PUNCTURE OR CUT.: Brand: BARD-PARKER SAFETY BLADES SIZE 15, STERILE

## (undated) DEVICE — BASIC DOUBLE BASIN 2-LF: Brand: MEDLINE INDUSTRIES, INC.

## (undated) DEVICE — SUT MONOCRYL 4-0 PS-2 18 IN Y496G

## (undated) DEVICE — GLOVE SRG BIOGEL 8

## (undated) DEVICE — GLOVE INDICATOR PI UNDERGLOVE SZ 7.5 BLUE

## (undated) DEVICE — TOWEL SET X-RAY

## (undated) DEVICE — LIGACLIP APPLIER MEDIUM

## (undated) DEVICE — MINOR PROCEDURE DRAPE: Brand: CONVERTORS

## (undated) DEVICE — ADHESIVE SKN CLSR HISTOACRYL FLEX 0.5ML LF

## (undated) DEVICE — SUT VICRYL 4-0 SH 27 IN J415H

## (undated) DEVICE — PACK GENERAL LF

## (undated) DEVICE — NEPTUNE E-SEP SMOKE EVACUATION PENCIL, COATED, 70MM BLADE, PUSH BUTTON SWITCH: Brand: NEPTUNE E-SEP

## (undated) DEVICE — PROXIMATE PLUS MD MULTI-DIRECTIONAL RELEASE SKIN STAPLERS CONTAINS 35 STAINLESS STEEL STAPLES APPROXIMATE CLOSED DIMENSIONS: 6.9MM X 3.9MM WIDE: Brand: PROXIMATE

## (undated) DEVICE — ADHESIVE SKIN HIGH VISCOSITY EXOFIN 1ML